# Patient Record
Sex: FEMALE | Race: WHITE
[De-identification: names, ages, dates, MRNs, and addresses within clinical notes are randomized per-mention and may not be internally consistent; named-entity substitution may affect disease eponyms.]

---

## 2017-11-21 ENCOUNTER — HOSPITAL ENCOUNTER (INPATIENT)
Dept: HOSPITAL 74 - JER | Age: 72
LOS: 7 days | Discharge: NURSING, REHAB OR LONG-TERM CARE | DRG: 682 | End: 2017-11-28
Attending: INTERNAL MEDICINE | Admitting: INTERNAL MEDICINE
Payer: COMMERCIAL

## 2017-11-21 VITALS — BODY MASS INDEX: 17.8 KG/M2

## 2017-11-21 DIAGNOSIS — K21.9: ICD-10-CM

## 2017-11-21 DIAGNOSIS — N13.39: ICD-10-CM

## 2017-11-21 DIAGNOSIS — F41.8: ICD-10-CM

## 2017-11-21 DIAGNOSIS — K63.9: ICD-10-CM

## 2017-11-21 DIAGNOSIS — N39.0: ICD-10-CM
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
## 2017-11-21 DIAGNOSIS — E87.1: ICD-10-CM

## 2017-11-21 DIAGNOSIS — S72.092A: ICD-10-CM

## 2017-11-21 DIAGNOSIS — N17.9: Primary | ICD-10-CM
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
## 2017-11-21 DIAGNOSIS — E87.6: ICD-10-CM

## 2017-11-21 DIAGNOSIS — E03.9: ICD-10-CM

## 2017-11-21 DIAGNOSIS — F03.90: ICD-10-CM

## 2017-11-21 DIAGNOSIS — D64.9: ICD-10-CM
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
## 2017-11-21 DIAGNOSIS — N18.9: ICD-10-CM

## 2017-11-21 PROCEDURE — P9038 RBC IRRADIATED: HCPCS

## 2017-11-21 PROCEDURE — P9058 RBC, L/R, CMV-NEG, IRRAD: HCPCS

## 2017-11-22 LAB
ALBUMIN SERPL-MCNC: 2.3 G/DL (ref 3.4–5)
ALP SERPL-CCNC: 104 U/L (ref 45–117)
ALT SERPL-CCNC: 34 U/L (ref 12–78)
ANION GAP SERPL CALC-SCNC: 12 MMOL/L (ref 8–16)
ANION GAP SERPL CALC-SCNC: 14 MMOL/L (ref 8–16)
APPEARANCE UR: (no result)
APTT BLD: 24.2 SECONDS (ref 26.9–34.4)
AST SERPL-CCNC: 29 U/L (ref 15–37)
BACTERIA #/AREA URNS HPF: (no result) /HPF
BASOPHILS # BLD: 0.1 % (ref 0–2)
BASOPHILS # BLD: 0.4 % (ref 0–2)
BILIRUB SERPL-MCNC: 0.3 MG/DL (ref 0.2–1)
BILIRUB UR STRIP.AUTO-MCNC: NEGATIVE MG/DL
CALCIUM SERPL-MCNC: 7.8 MG/DL (ref 8.5–10.1)
CALCIUM SERPL-MCNC: 8.3 MG/DL (ref 8.5–10.1)
CK SERPL-CCNC: 43 IU/L (ref 26–192)
CO2 SERPL-SCNC: 25 MMOL/L (ref 21–32)
CO2 SERPL-SCNC: 28 MMOL/L (ref 21–32)
COLOR UR: YELLOW
CREAT SERPL-MCNC: 1.8 MG/DL (ref 0.55–1.02)
CREAT SERPL-MCNC: 1.9 MG/DL (ref 0.55–1.02)
DEPRECATED RDW RBC AUTO: 15.6 % (ref 11.6–15.6)
DEPRECATED RDW RBC AUTO: 15.9 % (ref 11.6–15.6)
EOSINOPHIL # BLD: 0.1 % (ref 0–4.5)
EOSINOPHIL # BLD: 0.1 % (ref 0–4.5)
GLUCOSE SERPL-MCNC: 106 MG/DL (ref 74–106)
GLUCOSE SERPL-MCNC: 155 MG/DL (ref 74–106)
INR BLD: 1.19 (ref 0.82–1.09)
KETONES UR QL STRIP: NEGATIVE
LEUKOCYTE ESTERASE UR QL STRIP.AUTO: (no result)
MCH RBC QN AUTO: 29.4 PG (ref 25.7–33.7)
MCH RBC QN AUTO: 29.4 PG (ref 25.7–33.7)
MCHC RBC AUTO-ENTMCNC: 34.2 G/DL (ref 32–36)
MCHC RBC AUTO-ENTMCNC: 34.6 G/DL (ref 32–36)
MCV RBC: 84.8 FL (ref 80–96)
MCV RBC: 86 FL (ref 80–96)
NEUTROPHILS # BLD: 71 % (ref 42.8–82.8)
NEUTROPHILS # BLD: 74.4 % (ref 42.8–82.8)
NITRITE UR QL STRIP: NEGATIVE
PH UR: 5 [PH] (ref 5–8)
PLATELET # BLD AUTO: 342 K/MM3 (ref 134–434)
PLATELET # BLD AUTO: 343 K/MM3 (ref 134–434)
PMV BLD: 8.2 FL (ref 7.5–11.1)
PMV BLD: 8.3 FL (ref 7.5–11.1)
PROT SERPL-MCNC: 6.2 G/DL (ref 6.4–8.2)
PROT UR QL STRIP: (no result)
PROT UR QL STRIP: NEGATIVE
PT PNL PPP: 13.5 SEC (ref 9.98–11.88)
RBC # UR STRIP: (no result) /UL
RBC #/AREA URNS HPF: 6 /HPF (ref 0–3)
SP GR UR: 1.02 (ref 1–1.03)
TROPONIN I SERPL-MCNC: < 0.02 NG/ML (ref 0–0.05)
UROBILINOGEN UR STRIP-MCNC: NEGATIVE MG/DL (ref 0.2–1)
WBC # BLD AUTO: 7.2 K/MM3 (ref 4–10)
WBC # BLD AUTO: 7.2 K/MM3 (ref 4–10)
WBC #/AREA URNS HPF: 3414 /HPF (ref 3–5)

## 2017-11-22 PROCEDURE — 30233N1 TRANSFUSION OF NONAUTOLOGOUS RED BLOOD CELLS INTO PERIPHERAL VEIN, PERCUTANEOUS APPROACH: ICD-10-PCS

## 2017-11-22 RX ADMIN — SODIUM CHLORIDE SCH MLS/HR: 9 INJECTION, SOLUTION INTRAVENOUS at 04:14

## 2017-11-22 RX ADMIN — Medication SCH: at 08:40

## 2017-11-22 RX ADMIN — Medication SCH ML: at 11:21

## 2017-11-22 RX ADMIN — SODIUM CHLORIDE SCH: 9 INJECTION, SOLUTION INTRAVENOUS at 19:30

## 2017-11-22 RX ADMIN — PANTOPRAZOLE SODIUM SCH MG: 20 TABLET, DELAYED RELEASE ORAL at 11:21

## 2017-11-22 RX ADMIN — OXYCODONE HYDROCHLORIDE AND ACETAMINOPHEN SCH MG: 500 TABLET ORAL at 11:21

## 2017-11-22 RX ADMIN — ENOXAPARIN SODIUM SCH MG: 40 INJECTION SUBCUTANEOUS at 15:13

## 2017-11-22 RX ADMIN — DOCUSATE SODIUM SCH MG: 100 CAPSULE, LIQUID FILLED ORAL at 21:42

## 2017-11-22 RX ADMIN — PANTOPRAZOLE SODIUM SCH MG: 20 TABLET, DELAYED RELEASE ORAL at 04:14

## 2017-11-22 RX ADMIN — QUETIAPINE FUMARATE SCH MG: 25 TABLET ORAL at 11:20

## 2017-11-22 RX ADMIN — TRAZODONE HYDROCHLORIDE SCH MG: 50 TABLET ORAL at 21:43

## 2017-11-22 RX ADMIN — Medication SCH TAB: at 21:43

## 2017-11-22 RX ADMIN — QUETIAPINE FUMARATE SCH: 25 TABLET ORAL at 11:11

## 2017-11-22 RX ADMIN — OXYCODONE HYDROCHLORIDE AND ACETAMINOPHEN SCH: 500 TABLET ORAL at 11:12

## 2017-11-22 RX ADMIN — FERROUS SULFATE TAB EC 324 MG (65 MG FE EQUIVALENT) SCH MG: 324 (65 FE) TABLET DELAYED RESPONSE at 11:21

## 2017-11-22 RX ADMIN — QUETIAPINE FUMARATE SCH MG: 25 TABLET ORAL at 21:42

## 2017-11-22 RX ADMIN — SODIUM CHLORIDE SCH MLS/HR: 9 INJECTION, SOLUTION INTRAVENOUS at 09:56

## 2017-11-22 RX ADMIN — Medication SCH: at 11:11

## 2017-11-22 RX ADMIN — LEVOTHYROXINE SODIUM SCH MCG: 75 TABLET ORAL at 06:57

## 2017-11-22 RX ADMIN — Medication SCH TAB: at 11:21

## 2017-11-22 RX ADMIN — PANTOPRAZOLE SODIUM SCH: 20 TABLET, DELAYED RELEASE ORAL at 11:11

## 2017-11-22 RX ADMIN — FERROUS SULFATE TAB EC 324 MG (65 MG FE EQUIVALENT) SCH: 324 (65 FE) TABLET DELAYED RESPONSE at 11:11

## 2017-11-22 NOTE — CON.NEP
Consult


Consult Specialty:: Nephrology


Referred by:: Dr. Huerta


Reason for Consultation:: Acute on Chronc Renal Insuffiency





- History of Present Illness


Chief Complaint: Fall


History of Present Illness: 





This is a 72 year old woman with PMhx of Dementia, Frequent falls, Right 

Femoral Head Fx 11/2/2017 (refused Sx) presented with fall with left femoral 

fracture with BUN/Cr of 64/1.9. Pt is alert and oriented but unable to provide 

history. Her answers are inapporiate and she has wandering of ideas. Denies any 

pain, sob, N/V. 








- History Source


History Provided By: Patient


Limitations to Obtaining History: Clinical Condition





- Past Medical History


...Pregnant: No





- Alcohol/Substance Use


Hx Alcohol Use: No





- Smoking History


Smoking history: Never smoked


Have you smoked in the past 12 months: No





Home Medications





- Allergies


Allergies/Adverse Reactions: 


 Allergies











Allergy/AdvReac Type Severity Reaction Status Date / Time


 


No Known Allergies Allergy   Verified 11/21/17 23:49














- Home Medications


Home Medications: 


Ambulatory Orders





Aspirin [Tasha Chewable Aspirin] 81 mg PO DAILY 11/01/17 


Lactobacillus Acidophilus [Acidophilus] 1 each PO BID 11/01/17 


Trazodone HCl 25 mg PO HS 11/01/17 


Aa/Hydrolyzed Collagen, Whey [Lps Neutral Flavor Liquid] 30 ml PO DAILY 11/22/ 17 


Ascorbic Acid [Vitamin C -] 500 mg PO TID 11/22/17 


Ferrous Sulfate 325 mg PO TID 11/22/17 


Heparin - 5,000 unit SQ BID 11/22/17 


Levothyroxine [Synthroid -] 150 mcg PO ACBK 11/22/17 


Omeprazole Magnesium [Prilosec] 20 mg PO ACBK 11/22/17 


Potassium Chloride [K-Dur -] 20 meq PO DAILY 11/22/17 


Quetiapine Fumarate [Seroquel -] 37.5 mg PO BID 11/22/17 











Family Disease History





- Family Disease History


Family History: Unable to Obtain





Review of Systems





- Review of Systems


Constitutional: reports: No Symptoms


Eyes: reports: No Symptoms


Neck: reports: No Symptoms


Cardiovascular: reports: No Symptoms


Respiratory: reports: No Symptoms


Gastrointestinal: reports: No Symptoms


Genitourinary: reports: No Symptoms


Musculoskeletal: reports: Joint Pain


Integumentary: reports: No Symptoms


Neurological: reports: No Symptoms


Psychiatric: reports: Other





Nephrology Consult





- Height


Height: 5 ft





- Weight


Weight: 41.413 kg





- BMI


Body Mass Index (BMI): 17.8





- Lab Results


CBC,BMP: 


 CBC, BMP





 11/22/17 09:45 





 11/22/17 09:45 








Anion Gap: 


 Anion Gap











Anion Gap  12  (8-16)   11/22/17  09:45    














- Imaging


Chest X-ray: Report Reviewed





- Physical Examination


Vital Signs: 


 Vital Signs











Temperature  99.0 F   11/22/17 14:58


 


Pulse Rate  113 H  11/22/17 14:58


 


Respiratory Rate  18   11/22/17 14:58


 


Blood Pressure  109/59   11/22/17 14:58


 


O2 Sat by Pulse Oximetry (%)  93 L  11/22/17 09:00











Constitutional: Yes: Well Nourished, No Distress, Calm


Eyes: Yes: Conjunctiva Clear


HENT: Yes: Atraumatic, Normocephalic


Neck: Yes: Supple


Cardiovascular: Yes: Regular Rate and Rhythm


Respiratory: Yes: Regular, CTA Bilaterally


Gastrointestinal: Yes: Normal Bowel Sounds, Soft.  No: Tenderness


Renal/: Yes: Kim Present.  No: Bladder Distention, CVA Tenderness - Left, 

CVA Tenderness - Right


Extremities: No: Cold, Cool, Cyanosis


Edema: No





Assessment/Plan





72 year old woman with PMhx of Dementia, Frequent falls, Right Femoral Head Fx 

11/2/2017 (refused Sx) presented with fall with left femoral fracture with BUN/

Cr of 64/1.9.





#Acute on Chronic Renal Insufficiency


Baseline Cr unknown, was ~1.5 last admission


Check Urine studies and Renal US to access kidney size and structure


continue kim for now as pt is immobile and on pain meds and at high risk for 

retention 


Check Urine Culture as urine appears cloudy 


Continue isotonic saline with fall precautions. 





#Hip Fracture


pain control 


surgical intervention when optimized





#Anemia (acute on chronic)


Check iron studies


transfuse PRN





#dementia


supportive care





Thank you 


Will follow





 Current Medications





Amino Acids (Prosource No Carb Liquid Pkt)  30 ml PO DAILY@0800 [facility]


   Last Admin: 11/22/17 11:21 Dose:  30 ml


Ascorbic Acid (Vitamin C -)  500 mg PO DAILY [facility]


   Last Admin: 11/22/17 11:21 Dose:  500 mg


Docusate Sodium (Colace -)  300 mg PO [facility]


Enoxaparin Sodium (Lovenox -)  40 mg SQ DAILY [facility]


   Stop: 11/23/17 10:01


   Last Admin: 11/22/17 15:13 Dose:  40 mg


Ferrous Sulfate (Feosol -)  325 mg PO DAILY [facility]


   Last Admin: 11/22/17 11:21 Dose:  325 mg


Sodium Chloride (Normal Saline -)  1,000 mls @ 75 mls/hr IV ASDIR [facility]


Lactobacillus Acidophilus (Bacid -)  1 tab PO BID [facility]


   Last Admin: 11/22/17 11:21 Dose:  1 tab


Levothyroxine Sodium (Synthroid -)  150 mcg PO DAILY@0700 [facility]


   Last Admin: 11/22/17 06:57 Dose:  150 mcg


Morphine Sulfate (Morphine Sulfate)  2 mg IVPUSH Q4H PRN


   PRN Reason: PAIN


   Last Admin: 11/22/17 09:57 Dose:  2 mg


Pantoprazole Sodium (Protonix -)  20 mg PO DAILY [facility]


   Last Admin: 11/22/17 11:21 Dose:  20 mg


Quetiapine Fumarate (Seroquel -)  37.5 mg PO BID [facility]


   Last Admin: 11/22/17 11:20 Dose:  37.5 mg


Trazodone HCl (Desyrel -)  25 mg PO HS [facility]

## 2017-11-22 NOTE — HP
CHIEF COMPLAINT: left hip pain





PCP: Deanna





HISTORY OF PRESENT ILLNESS:


This is a 72 year old female with a significant past medical history of 

dementia and frequent falls s/p R humeral head fx 17 who presented to the 

ED with left hip pain s/p fall on . Xray done at nursing home ([facility]) and was reported as no fracture but pain persisted so they sent 

her here. Pt reports pain to left hip but denies to right shoulder. No other 

complaints on exam.





ER course was notable for:


(1) L femoral neck fracture


(2) Hgb 7.5


(3) BUN 64/1.9





Recent Travel:


pt denies


PAST MEDICAL HISTORY:


hypothyroidism


c diff colitis


anxiety


gerd


anemia


hypokalemia


R proximal humerus fracture 17, pt refused surgery


PAST SURGICAL HISTORY:


unknown





Social History:


Smoking: smoked briefly in her teens, none since


Alcohol: none presently


Drugs: none





Family History:


father  age 74, stomach CA


mother  84, heart failure


sister alive and well


no children





Allergies


No Known Allergies Allergy (Verified 17 23:49)





HOME MEDICATIONS:


 3





 Medication  Instructions  Recorded


 


Aspirin [Tasha Chewable Aspirin] 81 mg PO DAILY 17


 


Lactobacillus Acidophilus 1 each PO BID 17





[Acidophilus]  


 


Quetiapine Fumarate [Seroquel -] 37.5 mg PO BID 17


 


Trazodone HCl 25 mg PO HS 17


 


Ascorbic Acid [Vitamin C -] 500 mg PO DAILY #30 tablet 17


 


Ferrous Sulfate [Feosol] 325 mg PO DAILY #30 tab 17


 


Heparin - 5,000 unit SQ BID #1 vial 17


 


Levothyroxine [Synthroid -] 150 mcg PO DAILY@0700 #30 tablet 17


 


LPS 30cc daily 


 


prilosec 20mg daily 


 


vitamin c 500mg TID 


 


ferrous sulfate 325mg TID 








REVIEW OF SYSTEMS


CONSTITUTIONAL: 


Absent:  fever, chills, diaphoresis, generalized weakness, malaise, loss of 

appetite, weight change


HEENT: 


Absent:  rhinorrhea, nasal congestion, throat pain, throat swelling, difficulty 

swallowing, mouth swelling, ear pain, eye pain, visual changes


CARDIOVASCULAR: 


Absent: chest pain, syncope, palpitations, irregular heart rate, lightheadedness

, peripheral edema


RESPIRATORY: 


Absent: cough, shortness of breath, dyspnea with exertion, orthopnea, wheezing, 

stridor, hemoptysis


GASTROINTESTINAL:


Absent: abdominal pain, abdominal distension, nausea, vomiting, diarrhea, 

constipation, melena, hematochezia


GENITOURINARY: 


Absent: dysuria, frequency, urgency, hesitancy, hematuria, flank pain, genital 

pain


MUSCULOSKELETAL: Present: Left hip pain


Absent: myalgia, arthralgia, joint swelling, back pain, neck pain


SKIN: 


Absent: rash, itching, pallor


HEMATOLOGIC/IMMUNOLOGIC: 


Absent: easy bleeding, easy bruising, lymphadenopathy, frequent infections


ENDOCRINE:


Absent: unexplained weight gain, unexplained weight loss, heat intolerance, 

cold intolerance


NEUROLOGIC: 


Absent: headache, focal weakness or paresthesias, dizziness, unsteady gait, 

seizure, mental status changes, bladder or bowel incontinence


PSYCHIATRIC: 


Absent: anxiety, depression, suicidal or homicidal ideation, hallucinations.








PHYSICAL EXAMINATION


Vital Signs - 24 hr





 3





  17





  23:42 01:36


 


Temperature 99.5 F 


 


Pulse Rate 109 H 


 


Pulse Rate [  108 H





Apical]  


 


Respiratory 19 20





Rate  


 


Blood Pressure 101/83 


 


Blood Pressure  99/55





[Left Arm]  


 


O2 Sat by Pulse 97 93 L





Oximetry (%)  








GENERAL: Awake, alert, and fully oriented, in no acute distress.


HEAD: Normal with no signs of trauma.


EYES: Pupils equal, round and reactive to light, extraocular movements intact, 

sclera anicteric, conjunctiva clear. No lid lag.


EARS, NOSE, THROAT: Ears normal, nares patent, oropharynx clear without 

exudates. Moist mucous membranes.


NECK: Normal range of motion, supple without lymphadenopathy, JVD, or masses.


LUNGS: Breath sounds equal, clear to auscultation bilaterally. No wheezes, and 

no crackles. No accessory muscle use.


HEART: Regular rate and rhythm, normal S1 and S2 without murmur, rub or gallop.


ABDOMEN: Soft, nontender, not distended, normoactive bowel sounds, no guarding, 

no rebound, no masses.  No hepatomegaly or  splenomegaly. 


MUSCULOSKELETAL: Normal range of motion at all joints except left hip/right 

shoulder. No bony deformities or tenderness. No CVA tenderness. decreased ROM 

right shoulder but no pain. Pain on attempt at ROM left hip, not performed, 

Left leg shortened and externally rotated


UPPER EXTREMITIES: 2+ pulses, warm, well-perfused. No cyanosis. No clubbing. No 

peripheral edema.


LOWER EXTREMITIES: 2+ pulses, warm, well-perfused. No calf tenderness. No 

peripheral edema. 


NEUROLOGICAL:  Cranial nerves II-XII intact. Normal speech. Normal gait.


PSYCHIATRIC: Cooperative. Good eye contact. Appropriate mood and affect.


SKIN: Warm, dry, normal turgor, no rashes or lesions noted, normal capillary 

refill. 


 


Laboratory Results - last 24 hr





 3





  17





  02:23 02:23 02:23


 


WBC   


 


RBC   


 


Hgb   


 


Hct   


 


MCV   


 


MCH   


 


MCHC   


 


RDW   


 


Plt Count   


 


MPV   


 


Neutrophils %   


 


Lymphocytes %   


 


Monocytes %   


 


Eosinophils %   


 


Basophils %   


 


PT with INR  13.50 H  


 


INR  1.19 H  


 


PTT (Actin FS)  24.2 L  


 


Sodium   127 L 


 


Potassium   3.3 L 


 


Chloride   88 L D 


 


Carbon Dioxide   25 


 


Anion Gap   14 


 


BUN   64 H D 


 


Creatinine   1.9 H D 


 


Creat Clearance w eGFR   25.84 


 


Random Glucose   155 H D 


 


Lactic Acid   


 


Calcium   7.8 L 


 


Total Bilirubin   0.3  D 


 


AST   29  D 


 


ALT   34  D 


 


Alkaline Phosphatase   104  D 


 


Creatine Kinase   43 


 


Troponin I   < 0.02 


 


B-Natriuretic Peptide   810.74 H 


 


Total Protein   6.2 L 


 


Albumin   2.3 L D 


 


Urine Color   


 


Urine Appearance   


 


Urine pH   


 


Ur Specific Gravity   


 


Urine Protein   


 


Urine Glucose (UA)   


 


Urine Ketones   


 


Urine Blood   


 


Urine Nitrite   


 


Urine Bilirubin   


 


Urine Urobilinogen   


 


Urine Bacteria   


 


Crossmatch    See Detail








 3





  17





  02:23 02:48 02:55


 


WBC  7.2  D  


 


RBC  2.55 L  


 


Hgb  7.5 L  


 


Hct  21.9 L  


 


MCV  86.0  


 


MCH  29.4  


 


MCHC  34.2  


 


RDW  15.9 H  


 


Plt Count  343  


 


MPV  8.3  


 


Neutrophils %  74.4  


 


Lymphocytes %  8.8  D  


 


Monocytes %  16.6 H D  


 


Eosinophils %  0.1  D  


 


Basophils %  0.1  


 


PT with INR   


 


INR   


 


PTT (Actin FS)   


 


Sodium   


 


Potassium   


 


Chloride   


 


Carbon Dioxide   


 


Anion Gap   


 


BUN   


 


Creatinine   


 


Creat Clearance w eGFR   


 


Random Glucose   


 


Lactic Acid    1.7


 


Calcium   


 


Total Bilirubin   


 


AST   


 


ALT   


 


Alkaline Phosphatase   


 


Creatine Kinase   


 


Troponin I   


 


B-Natriuretic Peptide   


 


Total Protein   


 


Albumin   


 


Urine Color   Yellow 


 


Urine Appearance   Turbid 


 


Urine pH   5.0 


 


Ur Specific Gravity   1.017 


 


Urine Protein   2+ H 


 


Urine Glucose (UA)   Negative 


 


Urine Ketones   Negative 


 


Urine Blood   3+ H 


 


Urine Nitrite   Negative 


 


Urine Bilirubin   Negative 


 


Urine Urobilinogen   Negative 


 


Urine Bacteria   Many 


 


Crossmatch   








ECG


Sinus rhythm


vent rate 102, 


No acute ST/T changes





Radiology reports:


Left hip


femoral neck fracture noted, official read pending





CXR


no obvious infiltrates or effusions, official read pending








ASSESSMENT/PLAN:


72yF with PMH dementia, hypothyroidism, c diff colitis, anxiety, GERD, anemia, 

hypokalemia, R proximal humerus fracture presented to the ED s/p fall  

with hip pain.





Left femoral neck fracture


   - morphine 2mg IVP q4h PRN for pain


   - ortho consult


   - NPO for possible OR


   - kim cath for now, remove within 24 hours after fracture repair.





Anemia


   - will transfuse 1uPRBC, repeat h/h after


   - cont iron therapy





UTI


   - urine noted with gross pyuria


   - cont ceftriaxone, f/u cultures to ensure coverage





hypokalemia


   - repleted, repeat in am





SKIP


   - cr up from 1.45 on 


   - likely due to blood loss and volume depletion


   - start NS 100cc/hr, monitor volume status closely as she is also getting 

blood





Hyponatremia


   - likely due to hypovolemia, give IVF and repeat BMP 





hypothyroidism


   - synthroid dose increased a few weeks ago due to elevated TSH, will not 

repeat as adequate time interval (4-6weeks) has not elapsed, cont increased dose





DVT PPX


   - will defer due to possibility of OR tomorrow, start if pt does not go to OR





FEN


   - NS @ 100cc/hr


   - BMP in am


   - NPO for possible OR





Dispo: Pt currently requires inpatient management of her emergent symptoms and 

anticipated LOS is >48h











Visit type





- Emergency Visit


Emergency Visit: Yes


ED Registration Date: 17


Care time: The patient presented to the Emergency Department on the above date 

and was hospitalized for further evaluation of their emergent condition.





- New Patient


This patient is new to me today: Yes


Date on this admission: 17





- Critical Care


Critical Care patient: No

## 2017-11-22 NOTE — PDOC
History of Present Illness





- General


Chief Complaint: Injury


Stated Complaint: FALL


Time Seen by Provider: 11/22/17 00:01


History Source: Nursing Home Records


Exam Limitations: Dementia





- History of Present Illness


Initial Comments: 





11/22/17 02:25





73yo Female patient w/ PmHx: Anemia, CAD, GERD, Hypokalemia, Early Dementia, C-

Diff presented to ED via EMS from [facility] for fall. Staff 

state patient fell on Nov 20th, X-rays done on 21st results: No fracture or 

dislocation. Staff report patient continues to c/o pain and was sent to this ED 

for evaluation. Anticoagulation: ASA 81 and Heparin 5000 units.











Occurred: reports: other (2 days).  denies: just prior to arrival, this morning

, this afternoon, this evening, yesterday, last week


Severity: reports: moderate.  denies: mild, severe


Pain Location: reports: lower extremity.  denies: none, abdomen, back, chest, 

face, head, mouth, neck, other, pelvis, upper extremity


Method of Injury: Yes: fall.  No: unknown, assault, direct blow, motor vehicle 

crash, other


Modifying Factors: improves with: immobilization.  worse with: None, cold 

therapy, pain medication, rest, other





Past History





- Travel


Traveled outside of the country in the last 30 days: No


Close contact w/someone who was outside of country & ill: No





- Past Medical History


Allergies/Adverse Reactions: 


 Allergies











Allergy/AdvReac Type Severity Reaction Status Date / Time


 


No Known Allergies Allergy   Verified 11/21/17 23:49











Home Medications: 


Ambulatory Orders





Aspirin [Tasha Chewable Aspirin] 81 mg PO DAILY 11/01/17 


Lactobacillus Acidophilus [Acidophilus] 1 each PO BID 11/01/17 


Levothyroxine Sodium [Levo-T] 100 mcg PO DAILY 11/01/17 


Quetiapine Fumarate [Seroquel -] 37.5 mg PO BID 11/01/17 


Trazodone HCl 25 mg PO HS 11/01/17 


Vancomycin 125 mg PO QID 11/01/17 


Ascorbic Acid [Vitamin C -] 500 mg PO DAILY #30 tablet 11/04/17 


Ferrous Sulfate [Feosol] 325 mg PO DAILY #30 tab 11/04/17 


Heparin - 5,000 unit SQ BID #1 vial 11/04/17 


Levothyroxine [Synthroid -] 125 mcg PO DAILY@0700 #30 tablet 11/04/17 


Vancomycin Oral Solution 125 mg PO QID #1 ml 11/04/17 








Anemia: No


Asthma: No


Cancer: No


Cardiac Disorders: No


CVA: No


COPD: No


CHF: No


Dementia: No


Diabetes: No


GI Disorders: Yes (enterocolitis d/t c diff)


 Disorders: No


HTN: No


Hypercholesterolemia: No


Liver Disease: No


Psychiatric Problems: Yes (anxiety)


Seizures: No


Thyroid Disease: Yes (hypo)





- Surgical History


Abdominal Surgery: No


Appendectomy: No


Cardiac Surgery: No


Cholecystectomy: No


Lung Surgery: No


Neurologic Surgery: No


Orthopedic Surgery: No





- Suicide/Smoking/Psychosocial Hx


Smoking History: Never smoked


Have you smoked in the past 12 months: No


Information on smoking cessation initiated: No


Hx Alcohol Use: No


Drug/Substance Use Hx: No


Substance Use Type: None


Hx Substance Use Treatment: No





Trauma Specific PMHX





- Complaint Specific PMHX


Arthritis: No


Back Injury: No


Neck Injury: No


Hx Sacro Iliac Joint Dysfunction: No





**Review of Systems





- Review of Systems


Able to Perform ROS?: Yes


Is the patient limited English proficient: No


Musculoskeletal: Yes: Joint Pain, Other (Hip Pain)


All Other Systems: Reviewed and Negative





*Physical Exam





- Vital Signs


 Last Vital Signs











Temp Pulse Resp BP Pulse Ox


 


 99.5 F   108 H  20   99/55   93 L


 


 11/21/17 23:42  11/22/17 01:36  11/22/17 01:36  11/22/17 01:36  11/22/17 01:36














- Physical Exam


General Appearance: Yes: Appropriately Dressed, Moderate Distress, Thin


Neck: positive: Trachea midline, Supple.  negative: Lymphadenopathy (R), 

Lymphadenopathy (L), Tender lateral, Tender midline


Respiratory/Chest: positive: Lungs Clear, Normal Breath Sounds.  negative: 

Chest Tender, Respiratory Distress, Accessory Muscle Use, Labored Respiration, 

Rapid RR, Rhonchi, Stridor, Wheezing


Cardiovascular: positive: Tachycardia


Gastrointestinal/Abdominal: positive: Normal Bowel Sounds.  negative: Distended

, Guarding, Rebound, Tenderness


Musculoskeletal: positive: Normal Inspection.  negative: CVA Tenderness, 

Vertebral Tenderness


Extremity: positive: Normal Capillary Refill, Pelvis Stable.  negative: Normal 

Inspection (Left leg shortening), Normal Range of Motion (Limited ROM to left 

leg), Pedal Edema, Swelling, Calf Tenderness, Erythema, Inflammation


Integumentary: positive: Normal Color, Dry, Warm


Neurologic: positive: Alert, Normal Mood/Affect, Motor Strength 5/5





**Heart Score/ECG Review





- ECG Impressions


Normal ECG: Yes


Non-specific ST Elevation: No


Ischemic Changes: No


Bradycardia: No


Tachycardia: Sinus


Torsades leila Pointes: No


WPW: No





ED Treatment Course





- LABORATORY


CBC & Chemistry Diagram: 


 11/22/17 02:23





 11/22/17 02:23





- RADIOLOGY


Radiology Studies Ordered: 














 Category Date Time Status


 


 ABDOMEN & PELVIS CT W/O CONTR [CT] Stat CT Scan  11/22/17 02:08 Ordered


 


 CHEST X-RAY PORTABLE* [RAD] [facility]  11/22/17 02:07 Ordered


 


 HIP & PELVIS-LEFT [RAD] [facility]  11/22/17 00:39 Taken














*DC/Admit/Observation/Transfer


Diagnosis at time of Disposition: 


Anemia


Qualifiers:


 Anemia type: unspecified type Qualified Code(s): D64.9 - Anemia, unspecified





Fracture of left hip


Qualifiers:


 Encounter type: initial encounter Fracture type: closed Qualified Code(s): 

S72.002A - Fracture of unspecified part of neck of left femur, initial 

encounter for closed fracture





UTI (urinary tract infection)


Qualifiers:


 Urinary tract infection type: acute cystitis Hematuria presence: without 

hematuria Qualified Code(s): N30.00 - Acute cystitis without hematuria








- Discharge Dispostion


Condition at time of disposition: Fair


Admit: Yes





- Referrals


Referrals: 


Tyrone Huerta MD [Primary Care Provider] - 





- Patient Instructions





- Post Discharge Activity

## 2017-11-22 NOTE — PN
Progress Note (short form)





- Note


Progress Note: 





Patient seen and examined.


C/O Left hip pain


S/P Fall with left hip fracture.


Denies chest pain, shortness of breath, palpitation or dizziness.


Denies nausea, vomiting, abdominal pain.


Afebrile.





ER course was notable for:


(1) L femoral neck fracture


(2) Hgb 7.5


(3) BUN 64/1.9





Recent Travel:


pt denies


PAST MEDICAL HISTORY:


hypothyroidism


c diff colitis


anxiety


gerd


anemia


hypokalemia


R proximal humerus fracture 17, pt refused surgery


PAST SURGICAL HISTORY:


unknown





Social History:


Smoking: smoked briefly in her teens, none since


Alcohol: none presently


Drugs: none





Family History:


father  age 74, stomach CA


mother  84, heart failure


sister alive and well


no children





Allergies


No Known Allergies Allergy (Verified 17 23:49)





HOME MEDICATIONS:


 3





 Medication  Instructions  Recorded


 


Aspirin [Tasha Chewable Aspirin] 81 mg PO DAILY 17


 


Lactobacillus Acidophilus 1 each PO BID 17





[Acidophilus]  


 


Quetiapine Fumarate [Seroquel -] 37.5 mg PO BID 17


 


Trazodone HCl 25 mg PO HS 17


 


Ascorbic Acid [Vitamin C -] 500 mg PO DAILY #30 tablet 17


 


Ferrous Sulfate [Feosol] 325 mg PO DAILY #30 tab 17


 


Heparin - 5,000 unit SQ BID #1 vial 17


 


Levothyroxine [Synthroid -] 150 mcg PO DAILY@0700 #30 tablet 17


 


LPS 30cc daily 


 


prilosec 20mg daily 


 


vitamin c 500mg TID 


 


ferrous sulfate 325mg TID 








REVIEW OF SYSTEMS


CONSTITUTIONAL: 


Absent:  fever, chills, diaphoresis, generalized weakness, malaise, loss of 

appetite, weight change


HEENT: 


Absent:  rhinorrhea, nasal congestion, throat pain, throat swelling, difficulty 

swallowing, mouth swelling, ear pain, eye pain, visual changes


CARDIOVASCULAR: 


Absent: chest pain, syncope, palpitations, irregular heart rate, lightheadedness

, peripheral edema


RESPIRATORY: 


Absent: cough, shortness of breath, dyspnea with exertion, orthopnea, wheezing, 

stridor, hemoptysis


GASTROINTESTINAL:


Absent: abdominal pain, abdominal distension, nausea, vomiting, diarrhea, 

constipation, melena, hematochezia


GENITOURINARY: 


Absent: dysuria, frequency, urgency, hesitancy, hematuria, flank pain, genital 

pain


MUSCULOSKELETAL: Present: Left hip pain


Absent: myalgia, arthralgia, joint swelling, back pain, neck pain


SKIN: 


Absent: rash, itching, pallor


HEMATOLOGIC/IMMUNOLOGIC: 


Absent: easy bleeding, easy bruising, lymphadenopathy, frequent infections


ENDOCRINE:


Absent: unexplained weight gain, unexplained weight loss, heat intolerance, 

cold intolerance


NEUROLOGIC: 


Absent: headache, focal weakness or paresthesias, dizziness, unsteady gait, 

seizure, mental status changes, bladder or bowel incontinence


PSYCHIATRIC: 


Absent: anxiety, depression, suicidal or homicidal ideation, hallucinations.








PHYSICAL EXAMINATION


 Vital Signs











 Period  Temp  Pulse  Resp  BP Sys/Barriga  Pulse Ox


 


 Last 24 Hr  98.4 F-99.5 F    19-20  /55-83  93-97











GENERAL: Awake, alert, and fully oriented, in no acute distress.


HEAD: Normal with no signs of trauma.


EYES: Pupils equal, round and reactive to light, extraocular movements intact, 

sclera anicteric, conjunctiva clear. No lid lag.


EARS, NOSE, THROAT: Ears normal, nares patent, oropharynx clear without 

exudates. Moist mucous membranes.


NECK: Normal range of motion, supple without lymphadenopathy, JVD, or masses.


LUNGS: Breath sounds equal, clear to auscultation bilaterally. No wheezes, and 

no crackles. No accessory muscle use.


HEART: Regular rate and rhythm, normal S1 and S2 without murmur, rub or gallop.


ABDOMEN: Soft, nontender, not distended, normoactive bowel sounds, no guarding, 

no rebound, no masses.  No hepatomegaly or  splenomegaly. 


MUSCULOSKELETAL: Normal range of motion at all joints except left hip/right 

shoulder. No bony deformities or tenderness. No CVA tenderness. decreased ROM 

right shoulder but no pain. Pain on attempt at ROM left hip, not performed, 

Left leg shortened and externally rotated


UPPER EXTREMITIES: 2+ pulses, warm, well-perfused. No cyanosis. No clubbing. No 

peripheral edema.


LOWER EXTREMITIES: 2+ pulses, warm, well-perfused. No calf tenderness. No 

peripheral edema. 


NEUROLOGICAL:  Cranial nerves II-XII intact. Normal speech. Normal gait.


PSYCHIATRIC: Cooperative. Good eye contact. Appropriate mood and affect.


SKIN: Warm, dry, normal turgor, no rashes or lesions noted, normal capillary 

refill. 


 


 CBC, BMP





 17 09:45 





 17 09:45 














ECG


Sinus rhythm


vent rate 102, 


No acute ST/T changes





Radiology reports:


Left hip


femoral neck fracture noted, official read pending





CXR


no obvious infiltrates or effusions, official read pending








ASSESSMENT/PLAN:


72yF with PMH dementia, hypothyroidism, c diff colitis, anxiety, GERD, anemia, 

hypokalemia, R proximal humerus fracture presented to the ED s/p fall  

with hip pain.





Left femoral neck fracture


          - Ortho consult noted.


          - Surgery deferred today because of Na 129. Scheduled for Friday.


   - Continue morphine 2mg IVP q4h PRN for pain


   - kim cath for now, remove within 24 hours after fracture repair.





Anemia


   - H/H improved.


          - S/P 1 unit prbc transfusion.


   - Cont iron therapy





UTI


   - urine noted with gross pyuria


   - cont ceftriaxone, f/u cultures to ensure coverage





hypokalemia


   - repleted, repeat in am





SKIP


   - cr up from 1.45 on 


   - likely due to blood loss and volume depletion


   - Continue NS 75 cc/hr.





Hyponatremia


   - likely due to hypovolemia, continue  IVF and repeat BMP 





hypothyroidism


   - synthroid dose increased a few weeks ago due to elevated TSH, will not 

repeat as adequate time interval (4-6weeks) has not elapsed, cont increased dose





DVT PPX


   - Lovenox 40 mg sc daily. Plan for surgery on Friday.





FEN


   - NS @ 75 cc/hr


   - BMP in am


   - Regular diet.





Dispo: Pt currently requires inpatient management of her emergent symptoms and 

anticipated LOS is >48h











Patient is not medically cleared for surgery as of today because of Na 129 and 

SKIP.


Continue IV fluids. Check BMP tomorrow.


She should be ready for surgery on Friday.

## 2017-11-22 NOTE — EKG
Test Reason : 

Blood Pressure : ***/*** mmHG

Vent. Rate : 102 BPM     Atrial Rate : 102 BPM

   P-R Int : 128 ms          QRS Dur : 084 ms

    QT Int : 344 ms       P-R-T Axes : 042 026 045 degrees

   QTc Int : 448 ms

 

SINUS TACHYCARDIA

OTHERWISE NORMAL ECG

WHEN COMPARED WITH ECG OF 02-NOV-2017 03:19,

NO SIGNIFICANT CHANGE WAS FOUND

Confirmed by SOLOMON SIMPSON MD ([id number]) on 11/22/2017 10:37:58 AM

 

Referred By:             Confirmed By:SOLOMON SIMPSON MD

## 2017-11-22 NOTE — PDOC
*Physical Exam





- Vital Signs


 Last Vital Signs











Temp Pulse Resp BP Pulse Ox


 


 99.5 F   108 H  20   99/55   93 L


 


 11/21/17 23:42  11/22/17 01:36  11/22/17 01:36  11/22/17 01:36  11/22/17 01:36














ED Treatment Course





- LABORATORY


CBC & Chemistry Diagram: 


 11/22/17 09:45





 11/22/17 09:45





- ADDITIONAL ORDERS


Additional order review: 


 











  11/22/17





  02:23


 


RBC  2.55 L


 


MCV  86.0


 


MCHC  34.2


 


RDW  15.9 H


 


MPV  8.3


 


Neutrophils %  74.4


 


Lymphocytes %  8.8  D


 


Monocytes %  16.6 H D


 


Eosinophils %  0.1  D


 


Basophils %  0.1














Medical Decision Making





- Medical Decision Making





11/22/17 02:33


agree with care from ROSA Bermudez.  Pt to be admitted to hospitalist service for 

left hip fx





*DC/Admit/Observation/Transfer


Diagnosis at time of Disposition: 


 Anemia, Fracture of left hip, UTI (urinary tract infection)








- Discharge Dispostion


Condition at time of disposition: Fair





- Referrals





- Patient Instructions





- Post Discharge Activity

## 2017-11-22 NOTE — CONSULT
Consult - text type





- Consultation


Consultation Note: 





FULL CONSULT DICTATED





IMP: DISPLACED LEFT FEMORAL NECK HIP FX


PLAN: --> FOR LEFT HIP HEMIARTHROPLASTY WHEN MEDICALLY OPTIMIZED

## 2017-11-22 NOTE — PN
Progress Note (short form)





- Note


Progress Note: 





follow up Mw=594. BUN/Cr also elevated Anesthesia wants to hold off surgery 

until labs improve.  Patient scheduled for Friday morning

## 2017-11-23 LAB
ALBUMIN SERPL-MCNC: 2 G/DL (ref 3.4–5)
ALP SERPL-CCNC: 97 U/L (ref 45–117)
ALT SERPL-CCNC: 25 U/L (ref 12–78)
ANION GAP SERPL CALC-SCNC: 9 MMOL/L (ref 8–16)
AST SERPL-CCNC: 16 U/L (ref 15–37)
BASOPHILS # BLD: 0.3 % (ref 0–2)
BILIRUB SERPL-MCNC: 0.8 MG/DL (ref 0.2–1)
CALCIUM SERPL-MCNC: 7.8 MG/DL (ref 8.5–10.1)
CO2 SERPL-SCNC: 25 MMOL/L (ref 21–32)
CREAT SERPL-MCNC: 1.4 MG/DL (ref 0.55–1.02)
DEPRECATED RDW RBC AUTO: 16.3 % (ref 11.6–15.6)
EOSINOPHIL # BLD: 0.5 % (ref 0–4.5)
GLUCOSE SERPL-MCNC: 114 MG/DL (ref 74–106)
MAGNESIUM SERPL-MCNC: 2.3 MG/DL (ref 1.8–2.4)
MCH RBC QN AUTO: 29.5 PG (ref 25.7–33.7)
MCHC RBC AUTO-ENTMCNC: 34.6 G/DL (ref 32–36)
MCV RBC: 85.2 FL (ref 80–96)
NEUTROPHILS # BLD: 67.7 % (ref 42.8–82.8)
PHOSPHATE SERPL-MCNC: 3.7 MG/DL (ref 2.5–4.9)
PLATELET # BLD AUTO: 355 K/MM3 (ref 134–434)
PMV BLD: 8.3 FL (ref 7.5–11.1)
PROT SERPL-MCNC: 5.6 G/DL (ref 6.4–8.2)
WBC # BLD AUTO: 6.3 K/MM3 (ref 4–10)

## 2017-11-23 RX ADMIN — ENOXAPARIN SODIUM SCH MG: 40 INJECTION SUBCUTANEOUS at 09:25

## 2017-11-23 RX ADMIN — Medication SCH TAB: at 21:28

## 2017-11-23 RX ADMIN — QUETIAPINE FUMARATE SCH MG: 25 TABLET ORAL at 09:24

## 2017-11-23 RX ADMIN — LEVOTHYROXINE SODIUM SCH MCG: 75 TABLET ORAL at 06:37

## 2017-11-23 RX ADMIN — FERROUS SULFATE TAB EC 324 MG (65 MG FE EQUIVALENT) SCH MG: 324 (65 FE) TABLET DELAYED RESPONSE at 09:25

## 2017-11-23 RX ADMIN — DOCUSATE SODIUM SCH MG: 100 CAPSULE, LIQUID FILLED ORAL at 21:29

## 2017-11-23 RX ADMIN — OXYCODONE HYDROCHLORIDE AND ACETAMINOPHEN SCH MG: 500 TABLET ORAL at 09:24

## 2017-11-23 RX ADMIN — Medication SCH TAB: at 09:25

## 2017-11-23 RX ADMIN — PANTOPRAZOLE SODIUM SCH MG: 20 TABLET, DELAYED RELEASE ORAL at 09:25

## 2017-11-23 RX ADMIN — QUETIAPINE FUMARATE SCH: 25 TABLET ORAL at 09:29

## 2017-11-23 RX ADMIN — CEFTRIAXONE SCH MLS/HR: 1 INJECTION, SOLUTION INTRAVENOUS at 09:25

## 2017-11-23 RX ADMIN — QUETIAPINE FUMARATE SCH MG: 25 TABLET ORAL at 21:27

## 2017-11-23 RX ADMIN — SODIUM CHLORIDE SCH MLS/HR: 9 INJECTION, SOLUTION INTRAVENOUS at 02:46

## 2017-11-23 RX ADMIN — Medication SCH ML: at 09:24

## 2017-11-23 RX ADMIN — TRAZODONE HYDROCHLORIDE SCH MG: 50 TABLET ORAL at 21:30

## 2017-11-23 NOTE — PN
Progress Note (short form)





- Note


Progress Note: 





Subjective: The patient was seen and examined at the bedside, she states it is 

November 30, 1960. She is A&Ox2 (person and place)


Na improving 132 today





 Current Medications











Generic Name Dose Route Start Last Admin





  Trade Name Freq  PRN Reason Stop Dose Admin


 


Amino Acids  30 ml  11/22/17 08:00  11/23/17 09:24





  Prosource No Carb Liquid Pkt  PO   30 ml





  DAILY@0800 MARKO   Administration


 


Ascorbic Acid  500 mg  11/22/17 10:00  11/23/17 09:24





  Vitamin C -  PO   500 mg





  DAILY MARKO   Administration


 


Docusate Sodium  300 mg  11/22/17 22:00  11/22/17 21:42





  Colace -  PO   300 mg





  HS MARKO   Administration


 


Ferrous Sulfate  325 mg  11/22/17 10:00  11/23/17 09:25





  Feosol -  PO   325 mg





  DAILY MARKO   Administration


 


Sodium Chloride  1,000 mls @ 75 mls/hr  11/22/17 13:49  11/23/17 02:46





  Normal Saline -  IV   75 mls/hr





  ASDIR MARKO   Administration


 


CEFTRIAXONE 1 G/50 ML PREMIX  50 mls @ 100 mls/hr  11/23/17 10:00  11/23/17 09:

25





  Ceftriaxone 1 Gm-D5w Bag  IVPB   100 mls/hr





  DAILY MARKO   Administration


 


Lactobacillus Acidophilus  1 tab  11/22/17 10:00  11/23/17 09:25





  Bacid -  PO   1 tab





  BID MARKO   Administration


 


Levothyroxine Sodium  150 mcg  11/22/17 07:00  11/23/17 06:37





  Synthroid -  PO   150 mcg





  DAILY@0700 MARKO   Administration


 


Morphine Sulfate  2 mg  11/22/17 02:48  11/22/17 09:57





  Morphine Sulfate  IVPUSH   2 mg





  Q4H PRN   Administration





  PAIN   


 


Pantoprazole Sodium  20 mg  11/22/17 03:45  11/23/17 09:25





  Protonix -  PO   20 mg





  DAILY MARKO   Administration


 


Quetiapine Fumarate  37.5 mg  11/22/17 10:00  11/23/17 09:29





  Seroquel -  PO   Not Given





  BID MARKO   


 


Trazodone HCl  25 mg  11/22/17 22:00  11/22/17 21:43





  Desyrel -  PO   25 mg





  HS MARKO   Administration








Objective: 


 Vital Signs











 Period  Temp  Pulse  Resp  BP Sys/Barriga  Pulse Ox


 


 Last 24 Hr  98.1 F-99 F    18-20  /63-67  94-94








Physical Exam: 


General: NAD, A&Ox2


Lungs: CTA bilaterally


Heart:

## 2017-11-23 NOTE — PN
Progress Note (short form)





- Note


Progress Note: 





Renal follow up for SKIP





Pt seen and examined at the bedside


awake and alert


continues to have random and unusual responses to questions


but is oriented


making urine


on IVF 





 Vital Signs











Temperature  98.1 F   11/23/17 05:00


 


Pulse Rate  107 H  11/23/17 05:00


 


Respiratory Rate  18   11/23/17 05:00


 


Blood Pressure  94/64   11/23/17 05:00


 


O2 Sat by Pulse Oximetry (%)  94 L  11/22/17 21:00








 Intake & Output











 11/20/17 11/21/17 11/22/17 11/23/17





 23:59 23:59 23:59 23:59


 


Intake Total   2025 1550


 


Output Total   600 600


 


Balance   1425 950


 


Weight  43.091 kg 41.413 kg 








NAD, awake and alert


RRR


CTA


soft NT/ND


No LE edema





 CBC, BMP





 11/23/17 07:30 





 11/23/17 07:30 





 Current Medications





Amino Acids (Prosource No Carb Liquid Pkt)  30 ml PO DAILY@0800 [facility]


   Last Admin: 11/23/17 09:24 Dose:  30 ml


Ascorbic Acid (Vitamin C -)  500 mg PO DAILY [facility]


   Last Admin: 11/23/17 09:24 Dose:  500 mg


Docusate Sodium (Colace -)  300 mg PO HS [facility]


   Last Admin: 11/22/17 21:42 Dose:  300 mg


Ferrous Sulfate (Feosol -)  325 mg PO DAILY [facility]


   Last Admin: 11/23/17 09:25 Dose:  325 mg


Sodium Chloride (Normal Saline -)  1,000 mls @ 75 mls/hr IV ASDIR [facility]


   Last Admin: 11/23/17 02:46 Dose:  75 mls/hr


CEFTRIAXONE 1 G/50 ML PREMIX (Ceftriaxone 1 Gm-D5w Bag)  50 mls @ 100 mls/hr 

IVPB DAILY [facility]


   Last Admin: 11/23/17 09:25 Dose:  100 mls/hr


Lactobacillus Acidophilus (Bacid -)  1 tab PO BID [facility]


   Last Admin: 11/23/17 09:25 Dose:  1 tab


Levothyroxine Sodium (Synthroid -)  150 mcg PO DAILY@0700 [facility]


   Last Admin: 11/23/17 06:37 Dose:  150 mcg


Morphine Sulfate (Morphine Sulfate)  2 mg IVPUSH Q4H PRN


   PRN Reason: PAIN


   Last Admin: 11/22/17 09:57 Dose:  2 mg


Pantoprazole Sodium (Protonix -)  20 mg PO DAILY [facility]


   Last Admin: 11/23/17 09:25 Dose:  20 mg


Quetiapine Fumarate (Seroquel -)  37.5 mg PO BID [facility]


   Last Admin: 11/23/17 09:29 Dose:  Not Given


Trazodone HCl (Desyrel -)  25 mg PO HS [facility]


   Last Admin: 11/22/17 21:43 Dose:  25 mg





72 year old woman with PMhx of Dementia, Frequent falls, Right Femoral Head Fx 

11/2/2017 (refused Sx) presented with fall with left femoral fracture with BUN/

Cr of 64/1.9.





#Acute on Chronic Renal Insufficiency


Baseline Cr unknown, was ~1.5 last admission


Renal function is improving 


continue istonic saline


US showed hydronephrosis on right side, ? significnce and if there is a 

functional obstruction


will get a non-contrast CT to access further 


avoid ACE/ARB and contrast studies for now





#Hyponatremia


improving with isotonic saline


trend Na Q24hrs 





#Hip Fracture


pain control 


surgical intervention when optimized





#Anemia (acute on chronic)


Check iron studies


transfuse PRN





#dementia


supportive care





Thank you 


Will follow

## 2017-11-24 LAB
ALBUMIN SERPL-MCNC: 1.9 G/DL (ref 3.4–5)
ALP SERPL-CCNC: 95 U/L (ref 45–117)
ALT SERPL-CCNC: 39 U/L (ref 12–78)
ANION GAP SERPL CALC-SCNC: 10 MMOL/L (ref 8–16)
AST SERPL-CCNC: 32 U/L (ref 15–37)
BASOPHILS # BLD: 0.5 % (ref 0–2)
BILIRUB SERPL-MCNC: 0.6 MG/DL (ref 0.2–1)
CALCIUM SERPL-MCNC: 7.7 MG/DL (ref 8.5–10.1)
CO2 SERPL-SCNC: 24 MMOL/L (ref 21–32)
CREAT SERPL-MCNC: 1.1 MG/DL (ref 0.55–1.02)
DEPRECATED RDW RBC AUTO: 15.7 % (ref 11.6–15.6)
EOSINOPHIL # BLD: 0.9 % (ref 0–4.5)
GLUCOSE SERPL-MCNC: 114 MG/DL (ref 74–106)
MAGNESIUM SERPL-MCNC: 2.3 MG/DL (ref 1.8–2.4)
MCH RBC QN AUTO: 28.9 PG (ref 25.7–33.7)
MCHC RBC AUTO-ENTMCNC: 33.7 G/DL (ref 32–36)
MCV RBC: 85.7 FL (ref 80–96)
NEUTROPHILS # BLD: 71.3 % (ref 42.8–82.8)
PHOSPHATE SERPL-MCNC: 3.3 MG/DL (ref 2.5–4.9)
PLATELET # BLD AUTO: 383 K/MM3 (ref 134–434)
PMV BLD: 7.9 FL (ref 7.5–11.1)
PROT SERPL-MCNC: 5.6 G/DL (ref 6.4–8.2)
WBC # BLD AUTO: 6.6 K/MM3 (ref 4–10)

## 2017-11-24 RX ADMIN — LEVOTHYROXINE SODIUM SCH MCG: 75 TABLET ORAL at 06:18

## 2017-11-24 RX ADMIN — PANTOPRAZOLE SODIUM SCH: 20 TABLET, DELAYED RELEASE ORAL at 14:16

## 2017-11-24 RX ADMIN — OXYCODONE HYDROCHLORIDE AND ACETAMINOPHEN SCH: 500 TABLET ORAL at 14:16

## 2017-11-24 RX ADMIN — Medication SCH TAB: at 21:32

## 2017-11-24 RX ADMIN — CEFTRIAXONE SCH MLS/HR: 1 INJECTION, SOLUTION INTRAVENOUS at 11:06

## 2017-11-24 RX ADMIN — Medication SCH: at 14:16

## 2017-11-24 RX ADMIN — SODIUM CHLORIDE SCH MLS/HR: 9 INJECTION, SOLUTION INTRAVENOUS at 21:31

## 2017-11-24 RX ADMIN — FERROUS SULFATE TAB EC 324 MG (65 MG FE EQUIVALENT) SCH: 324 (65 FE) TABLET DELAYED RESPONSE at 14:16

## 2017-11-24 RX ADMIN — DOCUSATE SODIUM SCH MG: 100 CAPSULE, LIQUID FILLED ORAL at 21:32

## 2017-11-24 RX ADMIN — QUETIAPINE FUMARATE SCH MG: 25 TABLET ORAL at 16:06

## 2017-11-24 RX ADMIN — QUETIAPINE FUMARATE SCH MG: 25 TABLET ORAL at 21:32

## 2017-11-24 RX ADMIN — TRAZODONE HYDROCHLORIDE SCH MG: 50 TABLET ORAL at 21:32

## 2017-11-24 RX ADMIN — SODIUM CHLORIDE SCH MLS/HR: 9 INJECTION, SOLUTION INTRAVENOUS at 06:18

## 2017-11-24 NOTE — PN
Progress Note (short form)





- Note


Progress Note: 





Subjective: The patient was seen and examined at the bedside, she is refusing 

surgery at this time


Psych states the patient is competent to make her own decisions





 Current Medications











Generic Name Dose Route Start Last Admin





  Trade Name Freq  PRN Reason Stop Dose Admin


 


Amino Acids  30 ml  11/22/17 08:00  11/24/17 14:16





  Prosource No Carb Liquid Pkt  PO   Not Given





  DAILY@0800 MARKO   


 


Ascorbic Acid  500 mg  11/22/17 10:00  11/24/17 14:16





  Vitamin C -  PO   Not Given





  DAILY MARKO   


 


Docusate Sodium  300 mg  11/22/17 22:00  11/23/17 21:29





  Colace -  PO   300 mg





  HS MARKO   Administration


 


Ferrous Sulfate  325 mg  11/22/17 10:00  11/24/17 14:16





  Feosol -  PO   Not Given





  DAILY MARKO   


 


Sodium Chloride  1,000 mls @ 75 mls/hr  11/22/17 13:49  11/24/17 06:18





  Normal Saline -  IV   75 mls/hr





  ASDIR MARKO   Administration


 


CEFTRIAXONE 1 G/50 ML PREMIX  50 mls @ 100 mls/hr  11/23/17 10:00  11/24/17 11:

06





  Ceftriaxone 1 Gm-D5w Bag  IVPB   100 mls/hr





  DAILY MARKO   Administration


 


Lactobacillus Acidophilus  1 tab  11/22/17 10:00  11/24/17 14:16





  Bacid -  PO   Not Given





  BID MARKO   


 


Levothyroxine Sodium  150 mcg  11/22/17 07:00  11/24/17 06:18





  Synthroid -  PO   150 mcg





  DAILY@0700 MARKO   Administration


 


Morphine Sulfate  2 mg  11/22/17 02:48  11/22/17 09:57





  Morphine Sulfate  IVPUSH   2 mg





  Q4H PRN   Administration





  PAIN   


 


Pantoprazole Sodium  20 mg  11/22/17 03:45  11/24/17 14:16





  Protonix -  PO   Not Given





  DAILY MARKO   


 


Quetiapine Fumarate  50 mg  11/24/17 22:00  11/24/17 16:06





  Seroquel -  PO   50 mg





  BID MARKO   Administration


 


Trazodone HCl  25 mg  11/22/17 22:00  11/23/17 21:30





  Desyrel -  PO   25 mg





  HS MARKO   Administration








Objective: 


 


 Vital Signs











 Period  Temp  Pulse  Resp  BP Sys/Barriga  Pulse Ox


 


 Last 24 Hr  97.9 F-98.4 F  102-109  18-20  105-112/60-71  94








Physical Exam: 


General: NAD, A&Ox2


Lungs: CTA bilaterally


Heart:

## 2017-11-24 NOTE — PN
Progress Note (short form)





- Note


Progress Note: 





OR TEAMREADY TO GO TO TAKE PATIENT TO THE OR.  PATIENT NOW REFUSES SURGERY 

TODAY AND POSSIBLY FOREVER.  MULTIPLE MEMEBERS OF THE TEAM INCLUDING MYSELF, 

ANESTHESIA, NURSES AND SISTER TALKED TO HER BUT SHE WAS ADAMANT AND REFUSED.  

pSYCHOLOGY CALLED TO EVALUATE COMPETENCE

## 2017-11-24 NOTE — PN
Progress Note (short form)





- Note


Progress Note: 





Renal follow up for SKIP





Pt seen and examined at the bedside


awake and alert


no sob, chest pain, abd pain


has mild discomfort at the hip


making urine





 


 Vital Signs











Temperature  97.9 F   11/24/17 15:30


 


Pulse Rate  102 H  11/24/17 15:30


 


Respiratory Rate  18   11/24/17 15:30


 


Blood Pressure  112/71   11/24/17 15:30


 


O2 Sat by Pulse Oximetry (%)  94 L  11/23/17 21:00








 Intake & Output











 11/21/17 11/22/17 11/23/17 11/24/17





 23:59 23:59 23:59 23:59


 


Intake Total  2025 2300 1150


 


Output Total  600 1400 1300


 


Balance  1425 900 -150


 


Weight 43.091 kg 41.413 kg  











NAD, awake and alert


RRR


CTA


soft NT/ND


No LE edema





 


 CBC, BMP





 11/24/17 06:00 





 11/24/17 06:00 





 Current Medications





Amino Acids (Prosource No Carb Liquid Pkt)  30 ml PO DAILY@0800 [facility]


   Last Admin: 11/24/17 14:16 Dose:  Not Given


Ascorbic Acid (Vitamin C -)  500 mg PO DAILY [facility]


   Last Admin: 11/24/17 14:16 Dose:  Not Given


Docusate Sodium (Colace -)  300 mg PO HS [facility]


   Last Admin: 11/23/17 21:29 Dose:  300 mg


Ferrous Sulfate (Feosol -)  325 mg PO DAILY [facility]


   Last Admin: 11/24/17 14:16 Dose:  Not Given


Sodium Chloride (Normal Saline -)  1,000 mls @ 75 mls/hr IV ASDIR [facility]


   Last Admin: 11/24/17 06:18 Dose:  75 mls/hr


CEFTRIAXONE 1 G/50 ML PREMIX (Ceftriaxone 1 Gm-D5w Bag)  50 mls @ 100 mls/hr 

IVPB DAILY [facility]


   Last Admin: 11/24/17 11:06 Dose:  100 mls/hr


Lactobacillus Acidophilus (Bacid -)  1 tab PO BID [facility]


   Last Admin: 11/24/17 14:16 Dose:  Not Given


Levothyroxine Sodium (Synthroid -)  150 mcg PO DAILY@0700 [facility]


   Last Admin: 11/24/17 06:18 Dose:  150 mcg


Morphine Sulfate (Morphine Sulfate)  2 mg IVPUSH Q4H PRN


   PRN Reason: PAIN


   Last Admin: 11/22/17 09:57 Dose:  2 mg


Pantoprazole Sodium (Protonix -)  20 mg PO DAILY [facility]O


   Last Admin: 11/24/17 14:16 Dose:  Not Given


Quetiapine Fumarate (Seroquel -)  50 mg PO BID MARKO


Trazodone HCl (Desyrel -)  25 mg PO HS MARKO


   Last Admin: 11/23/17 21:30 Dose:  25 mg











72 year old woman with PMhx of Dementia, Frequent falls, Right Femoral Head Fx 

11/2/2017 (refused Sx) presented with fall with left femoral fracture with BUN/

Cr of 64/1.9.





#Acute on Chronic Renal Insufficiency


Baseline Cr unknown, was ~1.5 last admission


Renal function now improved and pt is non-oliguric 


can d/c IVF 


oral intake as tolerated


Ct of the Abd and Pelvis is pending for eval of right sided hydronephrosis





#Hyponatremia


improved to normal


oral intake as tolerated





#Hip Fracture


pain control 


surgical intervention when optimized





#Anemia (acute on chronic)


Check iron studies


transfuse PRN





#dementia


supportive care





Thank you 


Will follow

## 2017-11-24 NOTE — CON.PSY
Psychiatry Consult


Chief Complaint: Abram is a resident of [facility], admitted with 

Fractureed Hip following a fall. Patient apparantly is refusing Surgery. seen 

for capacity to make decisions.


Symptoms: reports: Anxiety





- Previous Psychiatric Treatment


Outpatient: None


Inpatient: None





- Previous Substance Abuse Treatment


Outpatient: None


Inpatient: None





- Reason for Previous Treatment


Reason for Previous Treatment: Anxiety or Panic Disorder





- Current Medications


Current Medications: 


Active Medications





Amino Acids (Prosource No Carb Liquid Pkt)  30 ml PO DAILY@0800 [facility]


   Last Admin: 17 09:24 Dose:  30 ml


Ascorbic Acid (Vitamin C -)  500 mg PO DAILY [facility]


   Last Admin: 17 09:24 Dose:  500 mg


Docusate Sodium (Colace -)  300 mg PO HS [facility]


   Last Admin: 17 21:29 Dose:  300 mg


Ferrous Sulfate (Feosol -)  325 mg PO DAILY [facility]


   Last Admin: 17 09:25 Dose:  325 mg


Sodium Chloride (Normal Saline -)  1,000 mls @ 75 mls/hr IV ASDIR [facility]


   Last Admin: 17 06:18 Dose:  75 mls/hr


CEFTRIAXONE 1 G/50 ML PREMIX (Ceftriaxone 1 Gm-D5w Bag)  50 mls @ 100 mls/hr 

IVPB DAILY [facility]


   Last Admin: 17 11:06 Dose:  100 mls/hr


Lactobacillus Acidophilus (Bacid -)  1 tab PO BID [facility]


   Last Admin: 17 21:28 Dose:  1 tab


Levothyroxine Sodium (Synthroid -)  150 mcg PO DAILY@0700 [facility]


   Last Admin: 17 06:18 Dose:  150 mcg


Morphine Sulfate (Morphine Sulfate)  2 mg IVPUSH Q4H PRN


   PRN Reason: PAIN


   Last Admin: 17 09:57 Dose:  2 mg


Pantoprazole Sodium (Protonix -)  20 mg PO DAILY [facility]


   Last Admin: 17 09:25 Dose:  20 mg


Quetiapine Fumarate (Seroquel -)  37.5 mg PO BID [facility]


   Last Admin: 17 21:27 Dose:  37.5 mg


Trazodone HCl (Desyrel -)  25 mg PO HS [facility]


   Last Admin: 17 21:30 Dose:  25 mg











- Allergies


Allergies: 


 Allergies











Allergy/AdvReac Type Severity Reaction Status Date / Time


 


No Known Allergies Allergy   Verified 17 23:49














- Current Living Status


Usual Living Arrangement: Nursing Home





- Current Mental Status Evaluation


Appearance: Disheveled


Attitude: Guarded





- Affect


Affect: Constrictive


Appropriateness: Appropriate to Content





- Mood


Mood: Anxious, Irritable





- Speech/Language


Expressive: Coherent





- Psychomotor Activity


Psychomotor Activity: Hyperactive





- Thought Process


Thought Process: Circumstantial





- Thought Content


Hallucinations: Absent


Delusions: Absent





- Self Perception


Self Perception: No Impairment





- Cognition


Attention: Alert


Memory, Immediate Recall: Intact


Memory, Short Term: 2/3


Memory, Remote with Promptin/3





- Concentration


Serial Sevens Intact: No


Simple Calculations Intact: No





- Abstraction


Proverb Interpretation: Intact


Judgement: Minimally Impaired





- Insight


Insight: Intact





- Impulse Control


Impulse Control: Minimally Impaired





- Suicidal Ideation


Suicidal Ideation: No





- Homicidal Ideation


Homicidal Ideation: No





Assessment/Plan





1) Patient has the functional capacity to make decisions at this time.


2) will increse SEroqul dose.


3) Patient is willing ti have Surgery but needs to be talked to and have her 

anxieties answered.

## 2017-11-25 LAB
ANION GAP SERPL CALC-SCNC: 10 MMOL/L (ref 8–16)
CALCIUM SERPL-MCNC: 7.5 MG/DL (ref 8.5–10.1)
CO2 SERPL-SCNC: 24 MMOL/L (ref 21–32)
CREAT SERPL-MCNC: 0.9 MG/DL (ref 0.55–1.02)
GLUCOSE SERPL-MCNC: 100 MG/DL (ref 74–106)
MAGNESIUM SERPL-MCNC: 2.2 MG/DL (ref 1.8–2.4)

## 2017-11-25 RX ADMIN — OXYCODONE HYDROCHLORIDE AND ACETAMINOPHEN SCH MG: 500 TABLET ORAL at 09:30

## 2017-11-25 RX ADMIN — PANTOPRAZOLE SODIUM SCH MG: 20 TABLET, DELAYED RELEASE ORAL at 09:30

## 2017-11-25 RX ADMIN — FERROUS SULFATE TAB EC 324 MG (65 MG FE EQUIVALENT) SCH MG: 324 (65 FE) TABLET DELAYED RESPONSE at 09:30

## 2017-11-25 RX ADMIN — QUETIAPINE FUMARATE SCH MG: 25 TABLET ORAL at 22:08

## 2017-11-25 RX ADMIN — TRAZODONE HYDROCHLORIDE SCH MG: 50 TABLET ORAL at 22:07

## 2017-11-25 RX ADMIN — SODIUM CHLORIDE SCH MLS/HR: 9 INJECTION, SOLUTION INTRAVENOUS at 09:29

## 2017-11-25 RX ADMIN — SODIUM CHLORIDE SCH MLS/HR: 9 INJECTION, SOLUTION INTRAVENOUS at 22:08

## 2017-11-25 RX ADMIN — LEVOTHYROXINE SODIUM SCH MCG: 75 TABLET ORAL at 06:15

## 2017-11-25 RX ADMIN — DOCUSATE SODIUM SCH: 100 CAPSULE, LIQUID FILLED ORAL at 22:08

## 2017-11-25 RX ADMIN — CEFTRIAXONE SCH MLS/HR: 1 INJECTION, SOLUTION INTRAVENOUS at 09:29

## 2017-11-25 RX ADMIN — Medication SCH ML: at 09:29

## 2017-11-25 RX ADMIN — Medication SCH TAB: at 22:08

## 2017-11-25 RX ADMIN — QUETIAPINE FUMARATE SCH MG: 25 TABLET ORAL at 09:30

## 2017-11-25 RX ADMIN — Medication SCH TAB: at 09:29

## 2017-11-25 NOTE — PN
Progress Note, Physician


History of Present Illness: 





Patient still debating surgery (seemed leaning towards surgery, but keeps 

changing her mind).  Appreciate psychiatry input (has capacity to decide on 

surgery).  Denies any pain currently.





- Current Medication List


Current Medications: 


Active Medications





Amino Acids (Prosource No Carb Liquid Pkt)  30 ml PO DAILY@0800 [facility]


   Last Admin: 11/25/17 09:29 Dose:  30 ml


Ascorbic Acid (Vitamin C -)  500 mg PO DAILY [facility]


   Last Admin: 11/25/17 09:30 Dose:  500 mg


Docusate Sodium (Colace -)  300 mg PO HS [facility]


   Last Admin: 11/24/17 21:32 Dose:  300 mg


Ferrous Sulfate (Feosol -)  325 mg PO DAILY [facility]


   Last Admin: 11/25/17 09:30 Dose:  325 mg


Sodium Chloride (Normal Saline -)  1,000 mls @ 75 mls/hr IV ASDIR [facility]


   Last Admin: 11/25/17 09:29 Dose:  75 mls/hr


CEFTRIAXONE 1 G/50 ML PREMIX (Ceftriaxone 1 Gm-D5w Bag)  50 mls @ 100 mls/hr 

IVPB DAILY [facility]


   Last Admin: 11/25/17 09:29 Dose:  100 mls/hr


Lactobacillus Acidophilus (Bacid -)  1 tab PO BID [facility]


   Last Admin: 11/25/17 09:29 Dose:  1 tab


Levothyroxine Sodium (Synthroid -)  150 mcg PO DAILY@0700 [facility]


   Last Admin: 11/25/17 06:15 Dose:  150 mcg


Morphine Sulfate (Morphine Sulfate)  2 mg IVPUSH Q4H PRN


   PRN Reason: PAIN


   Last Admin: 11/22/17 09:57 Dose:  2 mg


Pantoprazole Sodium (Protonix -)  20 mg PO DAILY [facility]


   Last Admin: 11/25/17 09:30 Dose:  20 mg


Quetiapine Fumarate (Seroquel -)  50 mg PO BID [facility]


   Last Admin: 11/25/17 09:30 Dose:  50 mg


Trazodone HCl (Desyrel -)  25 mg PO [facility]


   Last Admin: 11/24/17 21:32 Dose:  25 mg











- Objective


Vital Signs: 


 Vital Signs











Temperature  98.8 F   11/25/17 05:57


 


Pulse Rate  100 H  11/25/17 05:57


 


Respiratory Rate  18   11/25/17 05:57


 


Blood Pressure  100/58   11/25/17 05:57


 


O2 Sat by Pulse Oximetry (%)  94 L  11/24/17 21:00











Constitutional: Yes: No Distress, Calm


Neck: Yes: Supple, Trachea Midline


Cardiovascular: Yes: Regular Rate and Rhythm, S1, S2.  No: Murmur


Respiratory: Yes: Regular, CTA Bilaterally.  No: Rales, Rhonchi, Wheezes


Gastrointestinal: Yes: Normal Bowel Sounds, Soft.  No: Tenderness, Vomiting


Edema: No


Labs: 


 CBC, BMP





 11/24/17 06:00 





 11/25/17 05:17 





 INR, PTT











INR  1.19  (0.82-1.09)  H  11/22/17  02:23    














Assessment/Plan





Current Active Problems





Anemia (Acute)


Fracture of left hip (Acute)


UTI (urinary tract infection) (Acute)


GAVIN, now resolved


hypothyroid





-patient still deciding on surgery, so if agrees then OR in next 2 days

## 2017-11-25 NOTE — PN
Progress Note, Physician


Chief Complaint: 





The patient seen in her room. 


Confused, comfortable 


Urine output reportedly adequate. 


History of Present Illness: 





72 year old woman with PMhx of Dementia, Frequent falls, Right Femoral Head Fx 

11/2/2017 (refused Sx) presented with fall with left femoral fracture with BUN/

Cr of 64/1.9.


Renal functions improving with hydration





- Current Medication List


Current Medications: 


Active Medications





Amino Acids (Prosource No Carb Liquid Pkt)  30 ml PO DAILY@0800 [facility]


   Last Admin: 11/25/17 09:29 Dose:  30 ml


Ascorbic Acid (Vitamin C -)  500 mg PO DAILY [facility]


   Last Admin: 11/25/17 09:30 Dose:  500 mg


Docusate Sodium (Colace -)  300 mg PO HS [facility]


   Last Admin: 11/24/17 21:32 Dose:  300 mg


Ferrous Sulfate (Feosol -)  325 mg PO DAILY [facility]


   Last Admin: 11/25/17 09:30 Dose:  325 mg


Sodium Chloride (Normal Saline -)  1,000 mls @ 75 mls/hr IV ASDIR [facility]


   Last Admin: 11/25/17 09:29 Dose:  75 mls/hr


CEFTRIAXONE 1 G/50 ML PREMIX (Ceftriaxone 1 Gm-D5w Bag)  50 mls @ 100 mls/hr 

IVPB DAILY [facility]


   Last Admin: 11/25/17 09:29 Dose:  100 mls/hr


Lactobacillus Acidophilus (Bacid -)  1 tab PO BID [facility]


   Last Admin: 11/25/17 09:29 Dose:  1 tab


Levothyroxine Sodium (Synthroid -)  150 mcg PO DAILY@0700 [facility]


   Last Admin: 11/25/17 06:15 Dose:  150 mcg


Morphine Sulfate (Morphine Sulfate)  2 mg IVPUSH Q4H PRN


   PRN Reason: PAIN


   Last Admin: 11/22/17 09:57 Dose:  2 mg


Pantoprazole Sodium (Protonix -)  20 mg PO DAILY [facility]


   Last Admin: 11/25/17 09:30 Dose:  20 mg


Quetiapine Fumarate (Seroquel -)  50 mg PO BID [facility]


   Last Admin: 11/25/17 09:30 Dose:  50 mg


Trazodone HCl (Desyrel -)  25 mg PO HS [facility]


   Last Admin: 11/24/17 21:32 Dose:  25 mg











- Objective


Vital Signs: 


 Vital Signs











Temperature  98.8 F   11/25/17 05:57


 


Pulse Rate  100 H  11/25/17 05:57


 


Respiratory Rate  18   11/25/17 05:57


 


Blood Pressure  100/58   11/25/17 05:57


 


O2 Sat by Pulse Oximetry (%)  94 L  11/24/17 21:00











Constitutional: Yes: Calm


HENT: Yes: Atraumatic


Neck: Yes: Trachea Midline


Cardiovascular: Yes: S1, S2


Respiratory: Yes: CTA Bilaterally, Diminished


Gastrointestinal: Yes: Normal Bowel Sounds


Neurological: Yes: Confusion


Labs: 


 CBC, BMP





 11/24/17 06:00 





 11/25/17 05:17 





 INR, PTT











INR  1.19  (0.82-1.09)  H  11/22/17  02:23    














Problem List





- Problems


(1) Acute kidney failure


Code(s): N17.9 - ACUTE KIDNEY FAILURE, UNSPECIFIED   





(2) Anemia


Code(s): D64.9 - ANEMIA, UNSPECIFIED   


Qualifiers: 


   Anemia type: unspecified type   Qualified Code(s): D64.9 - Anemia, 

unspecified   





(3) Fracture of left hip


Code(s): S72.002A - FRACTURE OF UNSP PART OF NECK OF LEFT FEMUR, INIT   


Qualifiers: 


   Encounter type: initial encounter   Fracture type: closed   Qualified Code(s)

: S72.002A - Fracture of unspecified part of neck of left femur, initial 

encounter for closed fracture   





(4) UTI (urinary tract infection)


Code(s): N39.0 - URINARY TRACT INFECTION, SITE NOT SPECIFIED   


Qualifiers: 


   Urinary tract infection type: acute cystitis   Hematuria presence: without 

hematuria   Qualified Code(s): N30.00 - Acute cystitis without hematuria   





Assessment/Plan





72 year old woman with PMhx of Dementia, Frequent falls, Right Femoral Head Fx 

11/2/2017 (refused Sx) presented with fall with left femoral fracture with BUN/

Cr of 64/1.9.





Acute on Chronic Renal Insufficiency


The renal functions have improved to her baseline. 


The CT scan ordered to evaluate the Chronic unilateral Hydronephrosis is 

pending. 





Hyponatremia


improved to normal


oral intake as tolerated





Hip Fracture


pain control 


Surgical intervention when optimized





Anemia (acute on chronic)


Seems stable at this point. 





Dementia


supportive care





Will follow with you.





Sonia Urias MD

## 2017-11-25 NOTE — PN
Progress Note (short form)





- Note


Progress Note: 





FULL CONSULT DICTATED





IMP: NEEDS SURGERY BUT KEEPS REFUSING





PLAN:  -->OR ON MONDAY.  IF SHE STILL REFUSES--->SNF

## 2017-11-26 RX ADMIN — OXYCODONE HYDROCHLORIDE AND ACETAMINOPHEN SCH: 500 TABLET ORAL at 09:36

## 2017-11-26 RX ADMIN — LEVOTHYROXINE SODIUM SCH MCG: 75 TABLET ORAL at 06:07

## 2017-11-26 RX ADMIN — SODIUM CHLORIDE SCH MLS/HR: 9 INJECTION, SOLUTION INTRAVENOUS at 21:48

## 2017-11-26 RX ADMIN — CEFTRIAXONE SCH MLS/HR: 1 INJECTION, SOLUTION INTRAVENOUS at 10:14

## 2017-11-26 RX ADMIN — POLYETHYLENE GLYCOL 3350 SCH GRAMS: 17 POWDER, FOR SOLUTION ORAL at 10:14

## 2017-11-26 RX ADMIN — FERROUS SULFATE TAB EC 324 MG (65 MG FE EQUIVALENT) SCH: 324 (65 FE) TABLET DELAYED RESPONSE at 09:36

## 2017-11-26 RX ADMIN — Medication SCH TAB: at 09:36

## 2017-11-26 RX ADMIN — QUETIAPINE FUMARATE SCH MG: 25 TABLET ORAL at 09:36

## 2017-11-26 RX ADMIN — QUETIAPINE FUMARATE SCH: 25 TABLET ORAL at 21:49

## 2017-11-26 RX ADMIN — DOCUSATE SODIUM SCH: 100 CAPSULE, LIQUID FILLED ORAL at 21:48

## 2017-11-26 RX ADMIN — Medication SCH: at 21:48

## 2017-11-26 RX ADMIN — TRAZODONE HYDROCHLORIDE SCH: 50 TABLET ORAL at 21:49

## 2017-11-26 RX ADMIN — Medication SCH ML: at 09:36

## 2017-11-26 RX ADMIN — PANTOPRAZOLE SODIUM SCH MG: 20 TABLET, DELAYED RELEASE ORAL at 09:35

## 2017-11-26 NOTE — PN
Progress Note, Physician


Chief Complaint: 





The patient seen in her room. 


No new complaints. Refusing surgery 


urine output adequate. 


History of Present Illness: 





72 year old woman with PMhx of Dementia, Frequent falls, Right Femoral Head Fx 

11/2/2017 (refused Sx) presented with fall with left femoral fracture with BUN/

Cr of 64/1.9 on admission. 





Renal functions improving with hydration





The patient is refusing surgery now. 





- Current Medication List


Current Medications: 


Active Medications





Amino Acids (Prosource No Carb Liquid Pkt)  30 ml PO DAILY@0800 [facility]


   Last Admin: 11/26/17 09:36 Dose:  30 ml


Ascorbic Acid (Vitamin C -)  500 mg PO DAILY [facility]


   Last Admin: 11/26/17 09:36 Dose:  Not Given


Docusate Sodium (Colace -)  300 mg PO HS [facility]


   Last Admin: 11/25/17 22:08 Dose:  Not Given


Ferrous Sulfate (Feosol -)  325 mg PO DAILY [facility]


   Last Admin: 11/26/17 09:36 Dose:  Not Given


Sodium Chloride (Normal Saline -)  1,000 mls @ 75 mls/hr IV ASDIR [facility]


   Last Admin: 11/25/17 22:08 Dose:  75 mls/hr


CEFTRIAXONE 1 G/50 ML PREMIX (Ceftriaxone 1 Gm-D5w Bag)  50 mls @ 100 mls/hr 

IVPB DAILY [facility]


   Last Admin: 11/26/17 10:14 Dose:  100 mls/hr


Lactobacillus Acidophilus (Bacid -)  1 tab PO BID [facility]


   Last Admin: 11/26/17 09:36 Dose:  1 tab


Levothyroxine Sodium (Synthroid -)  150 mcg PO DAILY@0700 [facility]


   Last Admin: 11/26/17 06:07 Dose:  150 mcg


Morphine Sulfate (Morphine Sulfate)  2 mg IVPUSH Q4H PRN


   PRN Reason: PAIN


   Last Admin: 11/22/17 09:57 Dose:  2 mg


Pantoprazole Sodium (Protonix -)  20 mg PO DAILY [facility]


   Last Admin: 11/26/17 09:35 Dose:  20 mg


Polyethylene Glycol (Miralax (For Daily Use) -)  17 gm PO DAILY [facility]


   Last Admin: 11/26/17 10:14 Dose:  17 grams


Quetiapine Fumarate (Seroquel -)  50 mg PO BID [facility]


   Last Admin: 11/26/17 09:36 Dose:  50 mg


Trazodone HCl (Desyrel -)  25 mg PO HS [facility]


   Last Admin: 11/25/17 22:07 Dose:  25 mg











- Objective


Vital Signs: 


 Vital Signs











Temperature  99.1 F   11/26/17 14:02


 


Pulse Rate  107 H  11/26/17 14:02


 


Respiratory Rate  18   11/26/17 14:02


 


Blood Pressure  104/76   11/26/17 14:02


 


O2 Sat by Pulse Oximetry (%)  94 L  11/26/17 09:00











Constitutional: Yes: No Distress, Calm


Eyes: Yes: Conjunctiva Clear


Neck: Yes: Trachea Midline


Cardiovascular: Yes: S1, S2


Respiratory: Yes: CTA Bilaterally, Diminished


Gastrointestinal: Yes: Normal Bowel Sounds


Musculoskeletal: Yes: Back Pain


Extremities: Yes: Deformity, Internal Rotation, Shortened


Neurological: Yes: Confusion


Labs: 


 CBC, BMP





 11/24/17 06:00 





 11/25/17 05:17 





 INR, PTT











INR  1.19  (0.82-1.09)  H  11/22/17  02:23    














Problem List





- Problems


(1) Acute kidney failure


Code(s): N17.9 - ACUTE KIDNEY FAILURE, UNSPECIFIED   





(2) Anemia


Code(s): D64.9 - ANEMIA, UNSPECIFIED   


Qualifiers: 


   Anemia type: unspecified type   Qualified Code(s): D64.9 - Anemia, 

unspecified   





(3) Fracture of left hip


Code(s): S72.002A - FRACTURE OF UNSP PART OF NECK OF LEFT FEMUR, INIT   


Qualifiers: 


   Encounter type: initial encounter   Fracture type: closed   Qualified Code(s)

: S72.002A - Fracture of unspecified part of neck of left femur, initial 

encounter for closed fracture   





(4) UTI (urinary tract infection)


Code(s): N39.0 - URINARY TRACT INFECTION, SITE NOT SPECIFIED   


Qualifiers: 


   Urinary tract infection type: acute cystitis   Hematuria presence: without 

hematuria   Qualified Code(s): N30.00 - Acute cystitis without hematuria   





Assessment/Plan





72 year old woman with PMhx of Dementia, Frequent falls, Right Femoral Head Fx 

11/2/2017 presented with fall with left femoral fracture .





Acute on Chronic Renal Insufficiency


The renal functions have improved to her baseline. 


The CT scan ordered to evaluate the Chronic unilateral Hydronephrosis is 

pending. 





Hyponatremia


Has improved to normal. Oral intake as tolerated





Hip Fracture


pain control 


The patient needs surgery. 





Anemia (acute on chronic).  Seems stable at this point. 





Chronic Hydronephrosis. Awaiting CT scan. 





Dementia


supportive care





Will follow with you.





Sonia Urias MD

## 2017-11-26 NOTE — PN
DATE OF VISIT:  11/25/2017

 

The patient, today, adamantly refuses to have surgery again.  When requested earlier,

she told her medical doctor that she wanted surgery, but now she is saying that she

does not want surgery.  I am at a quandary now.  At this point, the patient keeps

giving conflicting opinions on what to do for her.  Psychiatry has seen the patient;

has seen the patient competent to make decisions on her own.  So, at this point, we

will just continue to medically optimize her and I will try one more time to book her

for surgery on Monday.  If she consents on that day, we will do her; otherwise, we

will transfer her back to the nursing home. 

 

 

PRATIBHA LIND M.D.

 

[id number]

DD: 11/25/2017 18:40

DT: 11/26/2017 07:58

Job #:  [id number]

## 2017-11-26 NOTE — PN
Progress Note, Physician


History of Present Illness: 





Patient now refusing surgery for hip. c/o cramping in abdomen, but no pain in 

hip currently.





- Current Medication List


Current Medications: 


Active Medications





Amino Acids (Prosource No Carb Liquid Pkt)  30 ml PO DAILY@0800 [facility]


   Last Admin: 11/26/17 09:36 Dose:  30 ml


Ascorbic Acid (Vitamin C -)  500 mg PO DAILY [facility]


   Last Admin: 11/26/17 09:36 Dose:  Not Given


Docusate Sodium (Colace -)  300 mg PO HS [facility]


   Last Admin: 11/25/17 22:08 Dose:  Not Given


Ferrous Sulfate (Feosol -)  325 mg PO DAILY [facility]


   Last Admin: 11/26/17 09:36 Dose:  Not Given


Sodium Chloride (Normal Saline -)  1,000 mls @ 75 mls/hr IV ASDIR [facility]


   Last Admin: 11/25/17 22:08 Dose:  75 mls/hr


CEFTRIAXONE 1 G/50 ML PREMIX (Ceftriaxone 1 Gm-D5w Bag)  50 mls @ 100 mls/hr 

IVPB DAILY [facility]


   Last Admin: 11/26/17 10:14 Dose:  100 mls/hr


Lactobacillus Acidophilus (Bacid -)  1 tab PO BID [facility]


   Last Admin: 11/26/17 09:36 Dose:  1 tab


Levothyroxine Sodium (Synthroid -)  150 mcg PO DAILY@0700 [facility]


   Last Admin: 11/26/17 06:07 Dose:  150 mcg


Morphine Sulfate (Morphine Sulfate)  2 mg IVPUSH Q4H PRN


   PRN Reason: PAIN


   Last Admin: 11/22/17 09:57 Dose:  2 mg


Pantoprazole Sodium (Protonix -)  20 mg PO DAILY [facility]


   Last Admin: 11/26/17 09:35 Dose:  20 mg


Polyethylene Glycol (Miralax (For Daily Use) -)  17 gm PO DAILY [facility]


   Last Admin: 11/26/17 10:14 Dose:  17 grams


Quetiapine Fumarate (Seroquel -)  50 mg PO BID [facility]


   Last Admin: 11/26/17 09:36 Dose:  50 mg


Trazodone HCl (Desyrel -)  25 mg PO HS [facility]


   Last Admin: 11/25/17 22:07 Dose:  25 mg











- Objective


Vital Signs: 


 Vital Signs











Temperature  98.2 F   11/26/17 08:24


 


Pulse Rate  101 H  11/26/17 08:24


 


Respiratory Rate  18   11/26/17 08:24


 


Blood Pressure  110/69   11/26/17 08:24


 


O2 Sat by Pulse Oximetry (%)  94 L  11/26/17 09:00











Constitutional: Yes: No Distress, Calm


Neck: Yes: Supple, Trachea Midline


Cardiovascular: Yes: Regular Rate and Rhythm, S1, S2.  No: Murmur


Respiratory: Yes: Regular, CTA Bilaterally.  No: Rales, Rhonchi, Wheezes


Gastrointestinal: Yes: Normal Bowel Sounds, Soft.  No: Distention, Tenderness


Edema: No


Neurological: Yes: Alert, Oriented


Labs: 


 CBC, BMP





 11/24/17 06:00 





 11/25/17 05:17 





 INR, PTT











INR  1.19  (0.82-1.09)  H  11/22/17  02:23    














Assessment/Plan





Current Active Problems





Anemia (Acute)


Fracture of left hip (Acute)


UTI (urinary tract infection) (Acute)


GAVIN, now resolved


hypothyroid





-now refusing surgery


-completing UTI abx treatment, then consider transfer back to [facility]

## 2017-11-27 LAB
ALBUMIN SERPL-MCNC: 1.7 G/DL (ref 3.4–5)
ALP SERPL-CCNC: 87 U/L (ref 45–117)
ALT SERPL-CCNC: 74 U/L (ref 12–78)
ANION GAP SERPL CALC-SCNC: 11 MMOL/L (ref 8–16)
AST SERPL-CCNC: 53 U/L (ref 15–37)
BASOPHILS # BLD: 0.6 % (ref 0–2)
BILIRUB SERPL-MCNC: 0.3 MG/DL (ref 0.2–1)
CALCIUM SERPL-MCNC: 7.2 MG/DL (ref 8.5–10.1)
CO2 SERPL-SCNC: 24 MMOL/L (ref 21–32)
CREAT SERPL-MCNC: 0.9 MG/DL (ref 0.55–1.02)
DEPRECATED RDW RBC AUTO: 15.8 % (ref 11.6–15.6)
EOSINOPHIL # BLD: 1.6 % (ref 0–4.5)
GLUCOSE SERPL-MCNC: 109 MG/DL (ref 74–106)
MCH RBC QN AUTO: 28.9 PG (ref 25.7–33.7)
MCHC RBC AUTO-ENTMCNC: 33.7 G/DL (ref 32–36)
MCV RBC: 85.6 FL (ref 80–96)
NEUTROPHILS # BLD: 76.5 % (ref 42.8–82.8)
PLATELET # BLD AUTO: 386 K/MM3 (ref 134–434)
PMV BLD: 7.6 FL (ref 7.5–11.1)
PROT SERPL-MCNC: 5.4 G/DL (ref 6.4–8.2)
WBC # BLD AUTO: 8.8 K/MM3 (ref 4–10)

## 2017-11-27 RX ADMIN — CEFTRIAXONE SCH MLS/HR: 1 INJECTION, SOLUTION INTRAVENOUS at 09:25

## 2017-11-27 RX ADMIN — POLYETHYLENE GLYCOL 3350 SCH: 17 POWDER, FOR SOLUTION ORAL at 09:19

## 2017-11-27 RX ADMIN — PANTOPRAZOLE SODIUM SCH: 20 TABLET, DELAYED RELEASE ORAL at 09:19

## 2017-11-27 RX ADMIN — Medication SCH TAB: at 11:28

## 2017-11-27 RX ADMIN — QUETIAPINE FUMARATE SCH: 25 TABLET ORAL at 09:19

## 2017-11-27 RX ADMIN — DOCUSATE SODIUM SCH MG: 100 CAPSULE, LIQUID FILLED ORAL at 21:19

## 2017-11-27 RX ADMIN — QUETIAPINE FUMARATE SCH MG: 25 TABLET ORAL at 11:29

## 2017-11-27 RX ADMIN — CEPHALEXIN SCH MG: 500 CAPSULE ORAL at 21:18

## 2017-11-27 RX ADMIN — CEPHALEXIN SCH: 500 CAPSULE ORAL at 09:00

## 2017-11-27 RX ADMIN — TRAZODONE HYDROCHLORIDE SCH MG: 50 TABLET ORAL at 21:18

## 2017-11-27 RX ADMIN — OXYCODONE HYDROCHLORIDE AND ACETAMINOPHEN SCH: 500 TABLET ORAL at 09:19

## 2017-11-27 RX ADMIN — CEPHALEXIN SCH MG: 500 CAPSULE ORAL at 11:28

## 2017-11-27 RX ADMIN — SODIUM CHLORIDE SCH MLS/HR: 9 INJECTION, SOLUTION INTRAVENOUS at 09:28

## 2017-11-27 RX ADMIN — Medication SCH: at 08:16

## 2017-11-27 RX ADMIN — LEVOTHYROXINE SODIUM SCH: 75 TABLET ORAL at 06:09

## 2017-11-27 RX ADMIN — Medication SCH TAB: at 21:18

## 2017-11-27 RX ADMIN — FERROUS SULFATE TAB EC 324 MG (65 MG FE EQUIVALENT) SCH: 324 (65 FE) TABLET DELAYED RESPONSE at 09:18

## 2017-11-27 RX ADMIN — OXYCODONE HYDROCHLORIDE AND ACETAMINOPHEN SCH MG: 500 TABLET ORAL at 11:28

## 2017-11-27 RX ADMIN — Medication SCH: at 09:18

## 2017-11-27 RX ADMIN — Medication SCH ML: at 11:28

## 2017-11-27 RX ADMIN — QUETIAPINE FUMARATE SCH MG: 25 TABLET ORAL at 21:18

## 2017-11-27 RX ADMIN — FERROUS SULFATE TAB EC 324 MG (65 MG FE EQUIVALENT) SCH MG: 324 (65 FE) TABLET DELAYED RESPONSE at 11:28

## 2017-11-27 RX ADMIN — PANTOPRAZOLE SODIUM SCH MG: 20 TABLET, DELAYED RELEASE ORAL at 11:28

## 2017-11-27 NOTE — PN
Progress Note (short form)





- Note


Progress Note: 





Patient refusing surgery for hip. 


Discussed in detail with patient but refusing surgery.








- Current Medication List


Current Medications: 


Active Medications





Amino Acids (Prosource No Carb Liquid Pkt)  30 ml PO DAILY@0800 [facility]


   Last Admin: 11/26/17 09:36 Dose:  30 ml


Ascorbic Acid (Vitamin C -)  500 mg PO DAILY [facility]


   Last Admin: 11/26/17 09:36 Dose:  Not Given


Docusate Sodium (Colace -)  300 mg PO HS [facility]


   Last Admin: 11/25/17 22:08 Dose:  Not Given


Ferrous Sulfate (Feosol -)  325 mg PO DAILY [facility]


   Last Admin: 11/26/17 09:36 Dose:  Not Given


Sodium Chloride (Normal Saline -)  1,000 mls @ 75 mls/hr IV ASDIR [facility]


   Last Admin: 11/25/17 22:08 Dose:  75 mls/hr


CEFTRIAXONE 1 G/50 ML PREMIX (Ceftriaxone 1 Gm-D5w Bag)  50 mls @ 100 mls/hr 

IVPB DAILY [facility]


   Last Admin: 11/26/17 10:14 Dose:  100 mls/hr


Lactobacillus Acidophilus (Bacid -)  1 tab PO BID [facility]


   Last Admin: 11/26/17 09:36 Dose:  1 tab


Levothyroxine Sodium (Synthroid -)  150 mcg PO DAILY@0700 [facility]


   Last Admin: 11/26/17 06:07 Dose:  150 mcg


Morphine Sulfate (Morphine Sulfate)  2 mg IVPUSH Q4H PRN


   PRN Reason: PAIN


   Last Admin: 11/22/17 09:57 Dose:  2 mg


Pantoprazole Sodium (Protonix -)  20 mg PO DAILY [facility]


   Last Admin: 11/26/17 09:35 Dose:  20 mg


Polyethylene Glycol (Miralax (For Daily Use) -)  17 gm PO DAILY [facility]


   Last Admin: 11/26/17 10:14 Dose:  17 grams


Quetiapine Fumarate (Seroquel -)  50 mg PO BID [facility]


   Last Admin: 11/26/17 09:36 Dose:  50 mg


Trazodone HCl (Desyrel -)  25 mg PO HS [facility]


   Last Admin: 11/25/17 22:07 Dose:  25 mg











- Objective


Vital Signs: 


 


 Vital Signs











 Period  Temp  Pulse  Resp  BP Sys/Barriga  Pulse Ox


 


 Last 24 Hr  98.2 F-99.1 F    18-20  104-121/70-76  95














Constitutional: Yes: No Distress, Calm


Neck: Yes: Supple, Trachea Midline


Cardiovascular: Yes: Regular Rate and Rhythm, S1, S2.  No: Murmur


Respiratory: Yes: Regular, CTA Bilaterally.  No: Rales, Rhonchi, Wheezes


Gastrointestinal: Yes: Normal Bowel Sounds, Soft.  No: Distention, Tenderness


Edema: No


Neurological: Yes: Alert, Oriented


Labs: 


 


 CBC, BMP





 11/27/17 06:45 





 11/27/17 06:45 














Assessment/Plan





Current Active Problems





Anemia (Acute)


Fracture of left hip (Acute)


UTI (urinary tract infection) (Acute)


GAVIN, now resolved


hypothyroid





-Refusing surgery


-Replete potassium


-CT abdomen & pelvis to be done today before transfer back to NH.


-Transfer back to NH after CTA&P.

## 2017-11-27 NOTE — PN
Progress Note (short form)





- Note


Progress Note: 





Renal follow up for SKIP





Pt seen and examined at the bedside


awake and alert


s/p CT of the Abd/Pelvis


making urine


no sob, chest pain





 


 Vital Signs











Temperature  98.0 F   11/27/17 14:00


 


Pulse Rate  102 H  11/27/17 14:00


 


Respiratory Rate  17   11/27/17 14:00


 


Blood Pressure  103/71   11/27/17 14:00


 


O2 Sat by Pulse Oximetry (%)  95   11/27/17 09:00








 Intake & Output











 11/24/17 11/25/17 11/26/17 11/27/17





 23:59 23:59 23:59 23:59


 


Intake Total 1350 1825 2300 2620


 


Output Total 1750 1500 1175 600


 


Balance - 2020














NAD, awake and alert


RRR


CTA


soft NT/ND


No LE edema





 


 CBC, BMP





 11/27/17 06:45 





 11/27/17 06:45 





 Current Medications





Amino Acids (Prosource No Carb Liquid Pkt)  30 ml PO DAILY@0800 [facility]


   Last Admin: 11/27/17 11:28 Dose:  30 ml


Ascorbic Acid (Vitamin C -)  500 mg PO DAILY [facility]


   Last Admin: 11/27/17 11:28 Dose:  500 mg


Cephalexin HCl (Keflex -)  500 mg PO BID [facility]


   Last Admin: 11/27/17 11:28 Dose:  500 mg


Docusate Sodium (Colace -)  300 mg PO HS [facility]


   Last Admin: 11/26/17 21:48 Dose:  Not Given


Ferrous Sulfate (Feosol -)  325 mg PO DAILY [facility]


   Last Admin: 11/27/17 11:28 Dose:  325 mg


Lactobacillus Acidophilus (Bacid -)  1 tab PO BID [facility]


   Last Admin: 11/27/17 11:28 Dose:  1 tab


Levothyroxine Sodium (Synthroid -)  150 mcg PO DAILY@0700 [facility]


   Last Admin: 11/27/17 06:09 Dose:  Not Given


Morphine Sulfate (Morphine Sulfate)  2 mg IVPUSH Q4H PRN


   PRN Reason: PAIN


   Last Admin: 11/22/17 09:57 Dose:  2 mg


Pantoprazole Sodium (Protonix -)  20 mg PO DAILY [facility]


   Last Admin: 11/27/17 11:28 Dose:  20 mg


Polyethylene Glycol (Miralax (For Daily Use) -)  17 gm PO DAILY [facility]


   Last Admin: 11/27/17 09:19 Dose:  Not Given


Quetiapine Fumarate (Seroquel -)  50 mg PO BID [facility]


   Last Admin: 11/27/17 11:29 Dose:  50 mg


Trazodone HCl (Desyrel -)  25 mg PO HS [facility]


   Last Admin: 11/26/17 21:49 Dose:  Not Given














72 year old woman with PMhx of Dementia, Frequent falls, Right Femoral Head Fx 

11/2/2017 (refused Sx) presented with fall with left femoral fracture with BUN/

Cr of 64/1.9.





#Acute on Chronic Renal Insufficiency


Renal function improved and stable


CT of the Abd showed Chronic appearing hydronephrosis with stones 


no aucte intervention needed but will need urology follow up as a outpatient 





#Rectal Lesion seen on CT


will need GI follow up as outpatient 





#Hip Fracture


pt refusing surgery 








Thank you 


Will follow

## 2017-11-28 VITALS — SYSTOLIC BLOOD PRESSURE: 99 MMHG | DIASTOLIC BLOOD PRESSURE: 65 MMHG | TEMPERATURE: 98.2 F | HEART RATE: 100 BPM

## 2017-11-28 LAB
ANION GAP SERPL CALC-SCNC: 9 MMOL/L (ref 8–16)
CALCIUM SERPL-MCNC: 7.6 MG/DL (ref 8.5–10.1)
CO2 SERPL-SCNC: 27 MMOL/L (ref 21–32)
CREAT SERPL-MCNC: 0.9 MG/DL (ref 0.55–1.02)
GLUCOSE SERPL-MCNC: 120 MG/DL (ref 74–106)
MAGNESIUM SERPL-MCNC: 2.1 MG/DL (ref 1.8–2.4)

## 2017-11-28 RX ADMIN — Medication SCH ML: at 07:42

## 2017-11-28 RX ADMIN — POTASSIUM CHLORIDE SCH: 1500 TABLET, EXTENDED RELEASE ORAL at 14:26

## 2017-11-28 RX ADMIN — PANTOPRAZOLE SODIUM SCH MG: 20 TABLET, DELAYED RELEASE ORAL at 11:03

## 2017-11-28 RX ADMIN — Medication SCH TAB: at 11:02

## 2017-11-28 RX ADMIN — FERROUS SULFATE TAB EC 324 MG (65 MG FE EQUIVALENT) SCH MG: 324 (65 FE) TABLET DELAYED RESPONSE at 11:03

## 2017-11-28 RX ADMIN — CEPHALEXIN SCH MG: 500 CAPSULE ORAL at 11:02

## 2017-11-28 RX ADMIN — LEVOTHYROXINE SODIUM SCH MCG: 75 TABLET ORAL at 06:10

## 2017-11-28 RX ADMIN — OXYCODONE HYDROCHLORIDE AND ACETAMINOPHEN SCH MG: 500 TABLET ORAL at 11:03

## 2017-11-28 RX ADMIN — POTASSIUM CHLORIDE SCH MEQ: 1500 TABLET, EXTENDED RELEASE ORAL at 12:04

## 2017-11-28 RX ADMIN — POLYETHYLENE GLYCOL 3350 SCH: 17 POWDER, FOR SOLUTION ORAL at 10:58

## 2017-11-28 RX ADMIN — QUETIAPINE FUMARATE SCH MG: 25 TABLET ORAL at 11:02

## 2017-11-28 NOTE — DS
Physical Examination


Vital Signs: 


 Vital Signs











Temperature  98.9 F   11/28/17 05:41


 


Pulse Rate  105 H  11/28/17 05:41


 


Respiratory Rate  19   11/28/17 05:41


 


Blood Pressure  105/60   11/28/17 05:41


 


O2 Sat by Pulse Oximetry (%)  96   11/27/17 19:54











Labs: 


 CBC, BMP





 11/27/17 06:45 





 11/28/17 07:50 











Discharge Summary


Reason For Visit: URINARY TRACT INFECTION,ANEMIA,FRACTUREOF LEFT HIP


Current Active Problems





Acute kidney failure (Acute)


Anemia (Acute)


Fracture of left hip (Acute)


UTI (urinary tract infection) (Acute)








Hospital Course: 





Ms. Matos admitted with left hip fracture, s/p fall. Refusing surgery. Shw as 

also treated for Acute renal failure with IV fluids and treated for UTI with 

abx. She finished her course. Her CT A&P showed chronic right sided 

hydronephrosis which will require outpatient urology follow up. Possible rectal 

mass will also require outpatient GI follow up/opinion. She is stable to be 

discharged back to NH.





Anemia (Acute)


Fracture of left hip (Acute)


UTI (urinary tract infection) (Acute)


GAVIN, now resolved


hypothyroid





-Refusing surgery


-Replete potassium


-CT abdomen & pelvis reviewed.


-Chronic right sided hydronephrosis - out patient urology follow up.


-Possible rectal mass - out patient GI follow up.


-Patient will be transferred back to [facility].


Condition: Fair





- Instructions


Referrals: 


Tyrone Huerta MD [Primary Care Provider] - 


Disposition: SKILLED NURSING FACILITY





- Home Medications


Comprehensive Discharge Medication List: 


Ambulatory Orders





Aspirin [Tasha Chewable Aspirin] 81 mg PO DAILY 11/01/17 


Lactobacillus Acidophilus [Acidophilus] 1 each PO BID 11/01/17 


Trazodone HCl 25 mg PO HS 11/01/17 


Aa/Hydrolyzed Collagen, Whey [Lps Neutral Flavor Liquid] 30 ml PO DAILY 11/22/ 17 


Ascorbic Acid [Vitamin C -] 500 mg PO TID 11/22/17 


Ferrous Sulfate 325 mg PO TID 11/22/17 


Heparin - 5,000 unit SQ BID 11/22/17 


Levothyroxine [Synthroid -] 150 mcg PO ACBK 11/22/17 


Omeprazole Magnesium [Prilosec] 20 mg PO ACBK 11/22/17 


Potassium Chloride [K-Dur -] 20 meq PO DAILY 11/22/17 


Quetiapine Fumarate [Seroquel -] 37.5 mg PO BID 11/22/17

## 2017-11-28 NOTE — PN
Progress Note (short form)





- Note


Progress Note: 





Patient refusing surgery for hip. 


CT abdomen and pelvis results noted.


No acute event overnight.


Afebrile.








 Current Medications





Amino Acids (Prosource No Carb Liquid Pkt)  30 ml PO DAILY@0800 [facility]


   Last Admin: 11/28/17 07:42 Dose:  30 ml


Ascorbic Acid (Vitamin C -)  500 mg PO DAILY [facility]


   Last Admin: 11/28/17 11:03 Dose:  Not Given


Cephalexin HCl (Keflex -)  500 mg PO BID [facility]


   Last Admin: 11/28/17 11:02 Dose:  500 mg


Docusate Sodium (Colace -)  300 mg PO [facility]


   Last Admin: 11/27/17 21:19 Dose:  300 mg


Ferrous Sulfate (Feosol -)  325 mg PO DAILY [facility]


   Last Admin: 11/28/17 11:03 Dose:  Not Given


Lactobacillus Acidophilus (Bacid -)  1 tab PO BID [facility]


   Last Admin: 11/28/17 11:02 Dose:  1 tab


Levothyroxine Sodium (Synthroid -)  150 mcg PO DAILY@0700 [facility]


   Last Admin: 11/28/17 06:10 Dose:  150 mcg


Pantoprazole Sodium (Protonix -)  20 mg PO DAILY [facility]


   Last Admin: 11/28/17 11:03 Dose:  20 mg


Potassium Chloride (K-Dur -)  40 meq PO Q1H [facility]


   Stop: 11/28/17 12:31


Potassium Chloride (K-Dur -)  20 meq PO DAILY [facility]


Quetiapine Fumarate (Seroquel -)  50 mg PO BID [facility]


   Last Admin: 11/28/17 11:02 Dose:  50 mg


Trazodone HCl (Desyrel -)  25 mg PO [facility]


   Last Admin: 11/27/17 21:18 Dose:  25 mg














- Objective


Vital Signs: 


 


 


 Vital Signs











 Period  Temp  Pulse  Resp  BP Sys/Barriga  Pulse Ox


 


 Last 24 Hr  98.0 F-98.9 F  102-105  17-21  101-105/60-71  96

















Constitutional: Yes: No Distress, Calm


Neck: Yes: Supple, Trachea Midline


Cardiovascular: Yes: Regular Rate and Rhythm, S1, S2.  No: Murmur


Respiratory: Yes: Regular, CTA Bilaterally.  No: Rales, Rhonchi, Wheezes


Gastrointestinal: Yes: Normal Bowel Sounds, Soft.  No: Distention, Tenderness


Edema: No


Neurological: Yes: Alert, Oriented


Labs: 


 


 


 CBC, BMP





 11/27/17 06:45 





 11/28/17 07:50 

















Assessment/Plan





Current Active Problems





Anemia (Acute)


Fracture of left hip (Acute)


UTI (urinary tract infection) (Acute)


GAVIN, now resolved


hypothyroid





-Refusing surgery


-Replete potassium


-CT abdomen & pelvis reviewed.


-Chronic right sided hydronephrosis - out patient urology follow up.


-Possible rectal mass - out patient GI follow up.


-Patient will be transferred back to [facility].

## 2018-01-12 ENCOUNTER — HOSPITAL ENCOUNTER (INPATIENT)
Dept: HOSPITAL 74 - JER | Age: 73
LOS: 12 days | Discharge: NURSING, REHAB OR LONG-TERM CARE | DRG: 371 | End: 2018-01-24
Attending: INTERNAL MEDICINE | Admitting: INTERNAL MEDICINE
Payer: COMMERCIAL

## 2018-01-12 VITALS — BODY MASS INDEX: 14.6 KG/M2

## 2018-01-12 DIAGNOSIS — E83.39: ICD-10-CM

## 2018-01-12 DIAGNOSIS — I95.9: ICD-10-CM

## 2018-01-12 DIAGNOSIS — N17.9: ICD-10-CM

## 2018-01-12 DIAGNOSIS — E87.6: ICD-10-CM

## 2018-01-12 DIAGNOSIS — D64.9: ICD-10-CM

## 2018-01-12 DIAGNOSIS — E03.9: ICD-10-CM

## 2018-01-12 DIAGNOSIS — K21.9: ICD-10-CM

## 2018-01-12 DIAGNOSIS — B96.89: ICD-10-CM

## 2018-01-12 DIAGNOSIS — Z91.81: ICD-10-CM

## 2018-01-12 DIAGNOSIS — E87.1: ICD-10-CM

## 2018-01-12 DIAGNOSIS — N13.30: ICD-10-CM

## 2018-01-12 DIAGNOSIS — E86.0: ICD-10-CM

## 2018-01-12 DIAGNOSIS — F03.90: ICD-10-CM

## 2018-01-12 DIAGNOSIS — A04.72: Primary | ICD-10-CM

## 2018-01-12 DIAGNOSIS — M25.552: ICD-10-CM

## 2018-01-12 DIAGNOSIS — G93.41: ICD-10-CM

## 2018-01-12 DIAGNOSIS — G89.29: ICD-10-CM

## 2018-01-12 DIAGNOSIS — E87.2: ICD-10-CM

## 2018-01-12 DIAGNOSIS — I25.10: ICD-10-CM

## 2018-01-12 LAB
ALBUMIN SERPL-MCNC: 4 G/DL (ref 3.4–5)
ALP SERPL-CCNC: 188 U/L (ref 45–117)
ALT SERPL-CCNC: 19 U/L (ref 12–78)
ANION GAP SERPL CALC-SCNC: 19 MMOL/L (ref 8–16)
ANION GAP SERPL CALC-SCNC: 21 MMOL/L (ref 8–16)
ANION GAP SERPL CALC-SCNC: 27 MMOL/L (ref 8–16)
APPEARANCE UR: (no result)
APTT BLD: 26.7 SECONDS (ref 26.9–34.4)
AST SERPL-CCNC: 14 U/L (ref 15–37)
BASOPHILS # BLD: 0.3 % (ref 0–2)
BILIRUB SERPL-MCNC: 0.4 MG/DL (ref 0.2–1)
BILIRUB UR STRIP.AUTO-MCNC: NEGATIVE MG/DL
BNP SERPL-MCNC: 644.57 PG/ML (ref 5–125)
BUN SERPL-MCNC: 158 MG/DL (ref 7–18)
BUN SERPL-MCNC: 164 MG/DL (ref 7–18)
BUN SERPL-MCNC: 85 MG/DL (ref 7–18)
CALCIUM SERPL-MCNC: 7.1 MG/DL (ref 8.5–10.1)
CALCIUM SERPL-MCNC: 7.1 MG/DL (ref 8.5–10.1)
CALCIUM SERPL-MCNC: 8.1 MG/DL (ref 8.5–10.1)
CAOX CRY URNS QL MICRO: (no result) /HPF
CHLORIDE 24H UR-SCNC: < 10 MEQ/L
CHLORIDE SERPL-SCNC: 66 MMOL/L (ref 98–107)
CHLORIDE SERPL-SCNC: 80 MMOL/L (ref 98–107)
CHLORIDE SERPL-SCNC: 81 MMOL/L (ref 98–107)
CHOLEST SERPL-MCNC: 232 MG/DL (ref 50–200)
CO2 SERPL-SCNC: 15 MMOL/L (ref 21–32)
CO2 SERPL-SCNC: 16 MMOL/L (ref 21–32)
CO2 SERPL-SCNC: 18 MMOL/L (ref 21–32)
COLOR UR: YELLOW
CREAT SERPL-MCNC: 2.6 MG/DL (ref 0.55–1.02)
CREAT SERPL-MCNC: 2.8 MG/DL (ref 0.55–1.02)
CREAT SERPL-MCNC: 3.4 MG/DL (ref 0.55–1.02)
CREAT UR-MCNC: 133 MG/DL (ref 20–320)
DEPRECATED RDW RBC AUTO: 16.7 % (ref 11.6–15.6)
EOSINOPHIL # BLD: 0.1 % (ref 0–4.5)
EPITH CASTS URNS QL MICRO: (no result) /HPF
GLUCOSE SERPL-MCNC: 151 MG/DL (ref 74–106)
GLUCOSE SERPL-MCNC: 166 MG/DL (ref 74–106)
GLUCOSE SERPL-MCNC: 188 MG/DL (ref 74–106)
HCT VFR BLD CALC: 41 % (ref 32.4–45.2)
HDLC SERPL-MCNC: 38 MG/DL (ref 40–60)
HGB BLD-MCNC: 13.4 GM/DL (ref 10.7–15.3)
HYALINE CASTS URNS QL MICRO: 3 /LPF
INR BLD: 1 (ref 0.82–1.09)
KETONES UR QL STRIP: NEGATIVE
LDLC SERPL CALC-MCNC: 170 MG/DL (ref 5–100)
LEUKOCYTE ESTERASE UR QL STRIP.AUTO: (no result)
LYMPHOCYTES # BLD: 7.6 % (ref 8–40)
MAGNESIUM SERPL-MCNC: 4.1 MG/DL (ref 1.8–2.4)
MCH RBC QN AUTO: 25.2 PG (ref 25.7–33.7)
MCHC RBC AUTO-ENTMCNC: 32.8 G/DL (ref 32–36)
MCV RBC: 76.8 FL (ref 80–96)
MONOCYTES # BLD AUTO: 3.9 % (ref 3.8–10.2)
MUCOUS THREADS URNS QL MICRO: (no result)
NEUTROPHILS # BLD: 88.1 % (ref 42.8–82.8)
NITRITE UR QL STRIP: NEGATIVE
OSMOLALITY SERPL: 298 MOSM/KG (ref 278–305)
OSMOLALITY SERPL: 304 MOSM/KG (ref 278–305)
OSMOLALITY,URINE: 376 MOSM/KG (ref 300–900)
PH UR: 5 [PH] (ref 5–8)
PLATELET # BLD AUTO: 493 K/MM3 (ref 134–434)
PMV BLD: 9.3 FL (ref 7.5–11.1)
POTASSIUM SERPLBLD-SCNC: 2.8 MMOL/L (ref 3.5–5.1)
POTASSIUM SERPLBLD-SCNC: 2.9 MMOL/L (ref 3.5–5.1)
POTASSIUM SERPLBLD-SCNC: 3.1 MMOL/L (ref 3.5–5.1)
PROT SERPL-MCNC: 9.2 G/DL (ref 6.4–8.2)
PROT UR QL STRIP: (no result)
PROT UR QL STRIP: NEGATIVE
PT PNL PPP: 11.3 SEC (ref 9.98–11.88)
RBC # BLD AUTO: 5.34 M/MM3 (ref 3.6–5.2)
RBC # UR STRIP: (no result) /UL
SODIUM SERPL-SCNC: 109 MMOL/L (ref 136–145)
SODIUM SERPL-SCNC: 117 MMOL/L (ref 136–145)
SODIUM SERPL-SCNC: 117 MMOL/L (ref 136–145)
SP GR UR: 1.01 (ref 1–1.03)
TRIGL SERPL-MCNC: 155 MG/DL (ref 35–160)
UROBILINOGEN UR STRIP-MCNC: NEGATIVE MG/DL (ref 0.2–1)
WBC # BLD AUTO: 12.5 K/MM3 (ref 4–10)
YEAST #/AREA URNS HPF: (no result) /[HPF]

## 2018-01-12 RX ADMIN — POTASSIUM CHLORIDE SCH MLS/HR: 7.46 INJECTION, SOLUTION INTRAVENOUS at 12:38

## 2018-01-12 RX ADMIN — POTASSIUM CHLORIDE SCH MLS/HR: 7.46 INJECTION, SOLUTION INTRAVENOUS at 13:45

## 2018-01-12 RX ADMIN — TAZOBACTAM SODIUM AND PIPERACILLIN SODIUM SCH MLS/HR: 250; 2 INJECTION, SOLUTION INTRAVENOUS at 20:31

## 2018-01-12 RX ADMIN — CHLORHEXIDINE GLUCONATE SCH APPLIC: 213 SOLUTION TOPICAL at 21:50

## 2018-01-12 RX ADMIN — HEPARIN SODIUM SCH UNIT: 5000 INJECTION, SOLUTION INTRAVENOUS; SUBCUTANEOUS at 21:50

## 2018-01-12 RX ADMIN — MUPIROCIN SCH APPLIC: 20 OINTMENT TOPICAL at 21:50

## 2018-01-12 RX ADMIN — POTASSIUM CHLORIDE SCH MLS/HR: 7.46 INJECTION, SOLUTION INTRAVENOUS at 14:37

## 2018-01-12 NOTE — CON.NEP
Consult


Consult Specialty:: Nephrology


Referred by:: Dr. Wyatt


Reason for Consultation:: SKIP, Hyponatremia





- History of Present Illness


Chief Complaint: AMS


History of Present Illness: 





This is a 72 year old woman with PMhx of CKD (Hx of SKIP, Hydronephrotic right 

Kidney), Dementia, Hypertension who presented from NH with syncope and AMS as 

inpatient and found to have hyponatremia with serum Na of 109 and SKIP. Pt is 

not able to provide any history. ED staff reports that pt was awake initially 

and was able to answer some questions but MS deteriorated in the ED. Central 

line placed in the ED. 





- History Source


History Provided By: Medical Record


Limitations to Obtaining History: Clinical Condition





- Past Medical History


CNS: Yes: Dementia


Renal/: Yes: UTI, Other (Chronic Right hydronephrosis)


Endocrine: Yes: Hypothyroidism





- Alcohol/Substance Use


Hx Alcohol Use: No





- Smoking History


Smoking history: Unknown if ever smoked


Have you smoked in the past 12 months: No





- Social History


Usual Living Arrangement: Nursing Home





Home Medications





- Allergies


Allergies/Adverse Reactions: 


 Allergies











Allergy/AdvReac Type Severity Reaction Status Date / Time


 


No Known Allergies Allergy   Verified 01/12/18 10:15














- Home Medications


Home Medications: 


Ambulatory Orders





Aa/Hydrolyzed Collagen, Whey [Lps Neutral Flavor Liquid] 30 ml PO BID 01/12/18 


Acetaminophen [Tylenol] 650 mg PO Q6H PRN 01/12/18 


Ascorbic Acid [Vitamin C -] 500 mg PO TID 01/12/18 


Aspirin [Aspirin EC] 81 mg PO DAILY 01/12/18 


Bacitracin - [Bacitracin Topical Ointment -] 1 applic TP DAILY 01/12/18 


Docusate Sodium [Colace -] 300 mg PO HS 01/12/18 


Ferrous Sulfate [Feosol] 325 mg PO TID 01/12/18 


Heparin - 5,000 unit SQ BID 01/12/18 


Lactobacillus Acidophilus [Acidophilus] 1 each PO BID 01/12/18 


Levothyroxine Sodium [Levoxyl] 150 mcg PO DAILY 01/12/18 


Omeprazole Magnesium [Prilosec Otc] 20 mg PO DAILY 01/12/18 


Oseltamivir Phosphate [Tamiflu] 75 mg PO DAILY 01/12/18 


Potassium Chloride [K-Dur -] 40 meq PO DAILY 01/12/18 


Quetiapine Fumarate [Seroquel -] 50 mg PO BID 01/12/18 


Sennosides [Senna] 2 tab PO DAILY 01/12/18 


Trazodone HCl 25 mg PO HS 01/12/18 











Family Disease History





- Family Disease History


Family History: Unable to Obtain





Review of Systems


Unable to obtain ROS, reason: because of clinical statu





Nephrology Consult





- Height


Height: 5 ft





- Weight


Weight: 40.37 kg





- BMI


Body Mass Index (BMI): 17.4





- Lab Results


CBC,BMP: 


 CBC, BMP





 01/12/18 10:20 





 01/12/18 10:20 








Anion Gap: 


 Anion Gap











Anion Gap  27  (8-16)  H  01/12/18  10:20    














- Imaging


Chest X-ray: Report Reviewed





- Physical Examination


Vital Signs: 


 Vital Signs











Temperature  98.4 F   01/12/18 10:31


 


Pulse Rate  89   01/12/18 15:39


 


Respiratory Rate  16   01/12/18 15:39


 


Blood Pressure  129/77   01/12/18 15:39


 


O2 Sat by Pulse Oximetry (%)  97   01/12/18 15:39











Constitutional: Yes: Thin, Other (Dry MM)


Eyes: Yes: Conjunctiva Clear


HENT: Yes: Atraumatic


Neck: Yes: Supple


Cardiovascular: Yes: Regular Rate and Rhythm, S1, S2.  No: Bruit, JVD


Respiratory: Yes: Regular, CTA Bilaterally


Gastrointestinal: Yes: Normal Bowel Sounds, Soft, Tenderness, Rebound


Renal/: Yes: Ash Present.  No: Bladder Distention, CVA Tenderness - Left, 

CVA Tenderness - Right


Edema: No


Neurological: No: Alert, Oriented





Assessment/Plan





72 year old woman with PMhx of CKD (Hx of SKIP, Hydronephrotic right Kidney), 

Dementia, Hypertension who presented from NH with syncope and AMS as inpatient 

and found to have hyponatremia with serum Na of 109 and SKIP.





#Acute Symptomatic Hypovolemic Hyponatremia


Serum Osm recorded as 298, 2 hours after first BMP drawn


will check repeat BMP and OSM now


started on 3% saline for symptomatic hyponatremia, will monitor Na closely


expect very rapid improvement given pt is hypovolemic so will give DDAVP 1mcg 

IV x 1 to prevent too rapid of a correction 


monitor MS


will need isotonic saline once MS is improved or Na improves by more then 8





#Acute Renal Failure


suspect from hypovolemia 


Urine Na is low indicating preserved tubular function 





#AMS/Syncope


likely due to electrolyte derangements and volume loss





#Leukocytosis/Sepsis


f/u cultures


Abx as per ID

## 2018-01-12 NOTE — CONSULT
Consult


Consult Specialty:: PULM / CCM


Referred by:: Dr. Akira Wyatt


Reason for Consultation:: Metabolic Disarray





- History of Present Illness


Chief Complaint: AMS


History of Present Illness: 


Ms. Locke is a 73 y/o woman w/ SREEKANTH, anemia, CAD, GERD, hypokalemia, & c-diff

, who [facility] the 

setting of copious diarrhea. HPI is limited due to patients dementia. In the ED 

pt remark for hyponatremia w/ a serum Na+ of 109 and SKIP. Pt admitted to the 

ICU for metabolic disarray.





- History Source


History Provided By: Medical Record, Transfer Record


Limitations to Obtaining History: Dementia





- Past Medical History


CNS: Yes: Dementia


Cardio/Vascular: Yes: CAD


Gastrointestinal: Yes: GERD


Renal/: Yes: UTI, Other (Chronic Right hydronephrosis)


Heme/Onc: Yes: Anemia


Endocrine: Yes: Hypothyroidism





- Alcohol/Substance Use


Hx Alcohol Use: No





- Smoking History


Smoking history: Unknown if ever smoked


Have you smoked in the past 12 months: No





- Social History


Usual Living Arrangement: Nursing Home





Home Medications





- Allergies


Allergies/Adverse Reactions: 


 Allergies











Allergy/AdvReac Type Severity Reaction Status Date / Time


 


No Known Allergies Allergy   Verified 01/12/18 10:15














- Home Medications


Home Medications: 


Ambulatory Orders





Aa/Hydrolyzed Collagen, Whey [Lps Neutral Flavor Liquid] 30 ml PO BID 01/12/18 


Acetaminophen [Tylenol] 650 mg PO Q6H PRN 01/12/18 


Ascorbic Acid [Vitamin C -] 500 mg PO TID 01/12/18 


Aspirin [Aspirin EC] 81 mg PO DAILY 01/12/18 


Bacitracin - [Bacitracin Topical Ointment -] 1 applic TP DAILY 01/12/18 


Docusate Sodium [Colace -] 300 mg PO HS 01/12/18 


Ferrous Sulfate [Feosol] 325 mg PO TID 01/12/18 


Heparin - 5,000 unit SQ BID 01/12/18 


Lactobacillus Acidophilus [Acidophilus] 1 each PO BID 01/12/18 


Levothyroxine Sodium [Levoxyl] 150 mcg PO DAILY 01/12/18 


Omeprazole Magnesium [Prilosec Otc] 20 mg PO DAILY 01/12/18 


Oseltamivir Phosphate [Tamiflu] 75 mg PO DAILY 01/12/18 


Potassium Chloride [K-Dur -] 40 meq PO DAILY 01/12/18 


Quetiapine Fumarate [Seroquel -] 50 mg PO BID 01/12/18 


Sennosides [Senna] 2 tab PO DAILY 01/12/18 


Trazodone HCl 25 mg PO HS 01/12/18 











Family Disease History





- Family Disease History


Family History: Unable to Obtain (SREEKANTH)





Review of Systems


Unable to obtain ROS, reason: SREEKANTH





Physical Exam


Vital Signs: 


 Vital Signs











Temperature  97.0 F L  01/12/18 18:00


 


Pulse Rate  88   01/12/18 20:00


 


Respiratory Rate  16   01/12/18 21:00


 


Blood Pressure  105/57   01/12/18 20:00


 


O2 Sat by Pulse Oximetry (%)  97   01/12/18 16:42











Constitutional: Yes: Well Nourished, No Distress, Cachectic


Eyes: Yes: WNL, Conjunctiva Clear, EOM Intact


HENT: Yes: WNL, Atraumatic


Neck: Yes: WNL, Supple, Trachea Midline


Cardiovascular: Yes: WNL, Regular Rate and Rhythm


Respiratory: Yes: WNL, Regular, CTA Bilaterally


Gastrointestinal: Yes: WNL, Soft, Hyperactive Bowel Sounds


...Rectal Exam: Yes: Deferred


Renal/: Yes: WNL


Breast(s): Yes: WNL


Musculoskeletal: Yes: WNL


Extremities: Yes: WNL


Edema: No


Integumentary: Yes: Other (DRY)


Neurological: Yes: Weakness


...Motor Strength: WNL


Psychiatric: Yes: WNL


Labs: 


 CBC, BMP





 01/12/18 10:20 





 01/12/18 20:20 











Imaging





- Results


Chest X-ray: Image Reviewed (12/12:  1. Interval placement of right internal 

jugular approach central venous catheter with the tip in  appropriate position, 

terminating over the cavoatrial junction.   2. No definable pneumothorax. No 

evidence of airspace consolidation or pleural effusion.   3. Ill-defined 

nodular opacities projecting over the right midlung and left upper lobe as 

above.  Please correlate with nonemergent, noncontrast chest CT.   4. 

Comminuted fracture of the right humeral head and neck, as seen on 11/01/2017 

right shoulder x- ray.)





Problem List





- Problems


(1) Diarrhea


Code(s): R19.7 - DIARRHEA, UNSPECIFIED   





(2) Hyponatremia


Code(s): E87.1 - HYPO-OSMOLALITY AND HYPONATREMIA   





(3) Dementia


Code(s): F03.90 - UNSPECIFIED DEMENTIA WITHOUT BEHAVIORAL DISTURBANCE   





(4) Acute kidney failure


Code(s): N17.9 - ACUTE KIDNEY FAILURE, UNSPECIFIED   





Assessment/Plan





ASSESS: This is a 73 y/o woman w/ SREEKANTH, HTN, anemia, CAD, GERD, CKD, & c-diff,  

who presented from NH with syncope and AMS as inpatient and found to have 

hyponatremia w/ a serum Na of 109 and SKIP. 





-Supp FiO2 prn


-Nebs


-CPT


-Pan Clxr


-Abx


-Determine cause of Diarrhea --> send C-Diff


-NS


-Do NOT correct Na+ faster than 0.5mEq/Hr


-Nothypertonic Saline


-Consider DDAVP


-Strict I's & O's


-Trend BUN/Cr


-Replete e-lytes prn


-RENAL





DGL, ACNP-BC


[facility] ICU


[id number]


PULM/CCM





Critical Care Time/MDM Note


Total Critical Care Time: 40


Critical Care Statement: The care of this patient involved high complexity 

decision making to prevent further life threatening deterioration of the patient

's condition and/or to evaluate & treat vital organ system(s) failure or risk 

of failure.

## 2018-01-12 NOTE — HP
Admitting History and Physical





- Primary Care Physician


PCP: Tyrone Huerta





- Admission


Chief Complaint: unable to obtain


History of Present Illness: 


72 year old female with history of Dementia, frequent falls, hypothyroidism, 

Anemia and Left hip fracture(refused surgery) brought in from [facility] s/p fall/syncope and diarrhea.  Unable to obtain further history due to pt

's mental status. 


History Source: Medical Record


Limitations to Obtaining History: Clinical Condition, Dementia





- Past Medical History


CNS: Yes: Dementia


Renal/: Yes: UTI, Other (Chronic Right hydronephrosis)


Heme/Onc: Yes: Anemia


Endocrine: Yes: Hypothyroidism





- Smoking History


Smoking history: Unknown if ever smoked


Have you smoked in the past 12 months: No





- Alcohol/Substance Use


Hx Alcohol Use: No





- Social History


Usual Living Arrangement: Yes: Nursing Home





Home Medications





- Allergies


Allergies/Adverse Reactions: 


 Allergies











Allergy/AdvReac Type Severity Reaction Status Date / Time


 


No Known Allergies Allergy   Verified 01/12/18 10:15














- Home Medications


Home Medications: 


Ambulatory Orders





Aa/Hydrolyzed Collagen, Whey [Lps Neutral Flavor Liquid] 30 ml PO BID 01/12/18 


Acetaminophen [Tylenol] 650 mg PO Q6H PRN 01/12/18 


Ascorbic Acid [Vitamin C -] 500 mg PO TID 01/12/18 


Aspirin [Aspirin EC] 81 mg PO DAILY 01/12/18 


Bacitracin - [Bacitracin Topical Ointment -] 1 applic TP DAILY 01/12/18 


Docusate Sodium [Colace -] 300 mg PO HS 01/12/18 


Ferrous Sulfate [Feosol] 325 mg PO TID 01/12/18 


Heparin - 5,000 unit SQ BID 01/12/18 


Lactobacillus Acidophilus [Acidophilus] 1 each PO BID 01/12/18 


Levothyroxine Sodium [Levoxyl] 150 mcg PO DAILY 01/12/18 


Omeprazole Magnesium [Prilosec Otc] 20 mg PO DAILY 01/12/18 


Oseltamivir Phosphate [Tamiflu] 75 mg PO DAILY 01/12/18 


Potassium Chloride [K-Dur -] 40 meq PO DAILY 01/12/18 


Quetiapine Fumarate [Seroquel -] 50 mg PO BID 01/12/18 


Sennosides [Senna] 2 tab PO DAILY 01/12/18 


Trazodone HCl 25 mg PO HS 01/12/18 











Family Disease History





- Family Disease History


Family History: Unable to Obtain





Physical Examination


Vital Signs: 


 Vital Signs











Temperature  98.4 F   01/12/18 10:31


 


Pulse Rate  88   01/12/18 13:15


 


Respiratory Rate  16   01/12/18 13:15


 


Blood Pressure  110/71   01/12/18 13:15


 


O2 Sat by Pulse Oximetry (%)  97   01/12/18 13:15











Constitutional: Yes: Calm, Thin


Cardiovascular: Yes: WNL, Regular Rate and Rhythm


Respiratory: Yes: WNL, CTA Bilaterally, Diminished, On Nasal O2


Gastrointestinal: Yes: Normal Bowel Sounds, Soft.  No: Distention


Edema: No


Integumentary: Yes: WNL


Labs: 


 CBC, BMP





 01/12/18 10:20 





 01/12/18 10:20 











Imaging





- Results


Chest X-ray: Report Reviewed


X-ray: Report Reviewed


Cat Scan: Report Reviewed





Problem List





- Problems


(1) Acute hyponatremia


Assessment/Plan: 


Pt found to have severe hypovolemic hyponatremia with altered mental status 


Nephrology consulted


Urine Na, Osm pending 


1L NS bolus given


NS 3% 24mL/hr, DDAVP 


Monitor Na level


Transfer to ICU for further management 


Code(s): E87.1 - HYPO-OSMOLALITY AND HYPONATREMIA   





(2) Acute metabolic encephalopathy


Assessment/Plan: 


Secondary to severe hyponatremia 


Treat hyponatremia per Nephrology recommendations 


ID consulted 


Code(s): G93.41 - METABOLIC ENCEPHALOPATHY   





(3) Acute hypokalemia


Assessment/Plan: 


Secondary to diarrhea, fluid loss


Replete as needed 


Code(s): E87.6 - HYPOKALEMIA   





(4) Acute kidney failure


Assessment/Plan: 


Most likely pre renal 


IVF hydration


Urine Na, Osm pending


Consider renal US, will discuss with Nephrology


Code(s): N17.9 - ACUTE KIDNEY FAILURE, UNSPECIFIED   





(5) Diarrhea


Assessment/Plan: 


cause of diarrhea unknown


c-diff test pending


ID consulted 


Code(s): R19.7 - DIARRHEA, UNSPECIFIED   





(6) Hypotension


Assessment/Plan: 


Secondary to fluid loss/hypovolemia


IVF hydration 


Code(s): I95.9 - HYPOTENSION, UNSPECIFIED   





(7) Fall


Assessment/Plan: 


s/p fall at SNF, xrays, head CT negative, chronic fracture of left hip(

unchanged from previous CT)


maintain bedrest at the moment  


Code(s): W19.XXXA - UNSPECIFIED FALL, INITIAL ENCOUNTER   





(8) Hypothyroidism


Assessment/Plan: 


Holding Levothyroxine for now  


Code(s): E03.9 - HYPOTHYROIDISM, UNSPECIFIED   





(9) Anemia


Assessment/Plan: 


hg/hct stable


will continue to monitor


Code(s): D64.9 - ANEMIA, UNSPECIFIED   


Qualifiers: 


   Anemia type: unspecified type   Qualified Code(s): D64.9 - Anemia, 

unspecified   





(10) Fracture of left hip


Assessment/Plan: 


unchanged from previous CT in November 


Pt had refused surgery 


Code(s): S72.002A - FRACTURE OF UNSP PART OF NECK OF LEFT FEMUR, INIT   


Qualifiers: 


   Encounter type: subsequent encounter   Fracture type: closed

## 2018-01-12 NOTE — PDOC
History of Present Illness





- General


History Source: Nursing Home Records, Old Records


Exam Limitations: Dementia





- History of Present Illness


Initial Comments: 


01/12/18 10:54


The patient is a 72-year-old female, with a significant past medical history of 

anemia, CAD, GERD, hypokalemia, early dementia, c-diff, who [facility]. The patient was found 

unconscious in the bathroom this morning. She was noted to have low O2 

saturation and was placed on O2. The patient has no memory of the incident. On 

exam, she reports that she is experiencing diarrhea. HPI is limited due to 

patients dementia. 





PCP: Dr. Huerta


Surgical Hx: Right Hip Surgery








<Ninfa Sharma - Last Filed: 01/12/18 17:18>





<Jaime Lemus - Last Filed: 01/12/18 18:40>





- General


Chief Complaint: Shortness of Breath


Stated Complaint: RESPIRATORY DISTRESS


Time Seen by Provider: 01/12/18 10:15





Past History





<Ninfa Sharma - Last Filed: 01/12/18 17:18>





- Past Medical History


Anemia: Yes


COPD: No


GI Disorders: Yes (gerd)


Psychiatric Problems: Yes (restlessness and agitation, psychosis.)


Thyroid Disease: Yes (hypo)





- Surgical History


Abdominal Surgery: No


Appendectomy: No


Cardiac Surgery: No


Cholecystectomy: No


Lung Surgery: No


Neurologic Surgery: No


Orthopedic Surgery:  (YES; RIGHT HIP SURGERY)





- Suicide/Smoking/Psychosocial Hx


Smoking History: Unknown if ever smoked


Have you smoked in the past 12 months: No


Information on smoking cessation initiated: No


Hx Alcohol Use: No


Drug/Substance Use Hx: No


Substance Use Type: None


Hx Substance Use Treatment: No





<Jaime Lemus - Last Filed: 01/12/18 18:40>





- Past Medical History


Allergies/Adverse Reactions: 


 Allergies











Allergy/AdvReac Type Severity Reaction Status Date / Time


 


No Known Allergies Allergy   Verified 01/12/18 10:15











Home Medications: 


Ambulatory Orders





Aa/Hydrolyzed Collagen, Whey [Lps Neutral Flavor Liquid] 30 ml PO BID 01/12/18 


Acetaminophen [Tylenol] 650 mg PO Q6H PRN 01/12/18 


Ascorbic Acid [Vitamin C -] 500 mg PO TID 01/12/18 


Aspirin [Aspirin EC] 81 mg PO DAILY 01/12/18 


Bacitracin - [Bacitracin Topical Ointment -] 1 applic TP DAILY 01/12/18 


Docusate Sodium [Colace -] 300 mg PO HS 01/12/18 


Ferrous Sulfate [Feosol] 325 mg PO TID 01/12/18 


Heparin - 5,000 unit SQ BID 01/12/18 


Lactobacillus Acidophilus [Acidophilus] 1 each PO BID 01/12/18 


Levothyroxine Sodium [Levoxyl] 150 mcg PO DAILY 01/12/18 


Omeprazole Magnesium [Prilosec Otc] 20 mg PO DAILY 01/12/18 


Oseltamivir Phosphate [Tamiflu] 75 mg PO DAILY 01/12/18 


Potassium Chloride [K-Dur -] 40 meq PO DAILY 01/12/18 


Quetiapine Fumarate [Seroquel -] 50 mg PO BID 01/12/18 


Sennosides [Senna] 2 tab PO DAILY 01/12/18 


Trazodone HCl 25 mg PO HS 01/12/18 











**Review of Systems





- Review of Systems


Comments:: 


01/12/18 10:55


ROS unable to obtain due to the patient's dementia. 





<Ninfa Sharma - Last Filed: 01/12/18 17:18>





*Physical Exam





- Vital Signs


 Last Vital Signs











Temp Pulse Resp BP Pulse Ox


 


 98.4 F   89   18   102/70   99 


 


 01/12/18 10:31  01/12/18 10:06  01/12/18 10:06  01/12/18 10:06  01/12/18 10:06














- Physical Exam


Comments: 


01/12/18 10:57


Vitals: Triage Vital signs reviewed


General Appearance:  no acute distress.


Head: Atraumatic, normocephalic


Throat: +Dry mucous membranes. Posterior oropharynx without erythema.


Neck:  Supple;No Nuchal rigidity


Chest Wall: Nontender


Cardiac: Regular rate and rhythm, no murmurs, no rubs, no gallops,


Lungs: Clear to auscultation bilateral, good air movement bilaterally,


Abdomen:  Soft, nondistended, normal bowel sounds, nontender to palpation


Rectal: Exam deferred


Extremities: Full range of motion to all extremities, no clubbing, or edema


Skin: +Bruising noted on right leg. Warm and dry.


Neuro: Cranial Nerves 2-12 grossly intact, Strength intact to all extremities, 

Sensation intact to all extremities


Psych:  normal mood, normal affect








<Ninfa Sharma - Last Filed: 01/12/18 17:18>





- Vital Signs


 Last Vital Signs











Temp Pulse Resp BP Pulse Ox


 


 97.9 F   89   18   102/70   99 


 


 01/12/18 10:06  01/12/18 10:06  01/12/18 10:06  01/12/18 10:06  01/12/18 10:06














<Jaime Lemus - Last Filed: 01/12/18 18:40>





**Heart Score/ECG Review





- ECG Intrepretation


Comment:: 


01/12/18 17:18


EKG performed at 10:04 demonstrates rate of 87 bpm, rhythm of normal sinus 

rhythm, axis equal to normal. 


Additional findings include: Possible left atrial enlargement. Junctional ST 

depression, probably normal.





<Ninfa Sharma - Last Filed: 01/12/18 17:18>





ED Treatment Course





- LABORATORY


CBC & Chemistry Diagram: 


 01/12/18 10:20





 01/12/18 10:20





<Ninfa Sharma - Last Filed: 01/12/18 17:18>





- LABORATORY


CBC & Chemistry Diagram: 


 01/12/18 10:20





 01/12/18 16:50





<Allan,Jaime - Last Filed: 01/12/18 18:40>





Medical Decision Making





- Medical Decision Making


01/12/18 12:00


Reevaluation: Received call from lab stating that patient's blood results were 

abnormal. 





01/12/18 12:15


Dr. Browne was paged and notified via phone service. Second page for Dr. Browne 

was placed at 12:41. Call returned at 12:44, Dr. Rocha will be called 

instead.





01/12/18 12:21


Dr. Huerta was contacted via phone service, message was left.





01/12/18 12:23


Dr. Wyatt was paged overhead. Second overhead page was placed at 12:55. 

Microblog was sent at 13:00. Call was returned at 1:20, Dr. Wyatt will accept 

the patient for ICU.





01/12/18 12:30


Dr. Byrnes was called on cell and message was left. Called returned at 12:46, 

Dr. Collins saw patient in ER. The patient will be consulted by Dr. Collins.





01/12/18 12:51


Dr. Rocha was paged and notified via phone service. Second page placed at 

13:10, call returned at 13:13. 








<Ninfa Sharma - Last Filed: 01/12/18 17:18>





- Critical Care Time


Total Critical Care Time (minutes): 65


Critical Care Statement: The care of this patient involved high complexity 

decision making to prevent further life threatening deterioration of the patient

's condition and/or to evaluate & treat vital organ system(s) failure or risk 

of failure.





- Medical Decision Making








Laboratory analysis concerning for severe hyponatremia and hypokalemia and 

slightly elevated white blood cell count





Central line placed patient verbally consented was unable to sign secondary to 

lethargy and altered mental status see procedure note





Hypertonic saline ordered. IV KCL ordered





Nephrology has been consult it ICU has been consulted, and infectious diseases 

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 

blood cell count





Patient started on Zosyn





Patient admitted to ICU by Dr. Sarika Rocha to consult














<Jaime Lemus - Last Filed: 01/12/18 18:40>





*DC/Admit/Observation/Transfer





- Attestations


Scribe Attestion: 


01/12/18 11:05





Documentation prepared by Ninfa Sharma, acting as medical scribe for Jaime Lemus MD.





<Ninfa Sharma - Last Filed: 01/12/18 17:18>





- Discharge Dispostion


Admit: Yes





<Jaiem Lemus - Last Filed: 01/12/18 18:40>


Diagnosis at time of Disposition: 


 Hyponatremia








- Discharge Dispostion


Condition at time of disposition: Critical

## 2018-01-12 NOTE — CON.ID
Consult


Consult Specialty:: infectious diseases


Reason for Consultation:: amd ,septicemia,uti,hyponatremia





- History of Present Illness


History of Present Illness: 





72 year old female with history of Dementia, frequent falls, hypothyroidism, 

Anemia and Left hip fracture(refused surgery) brought in from [facility] s/p fall/syncope and diarrhea.  Unable to obtain further history due to pt

's mental status.


patients complete history taken from the chart


patient being transferred to icu 


patient also has severe electrolyte abn





- History Source


History Provided By: Medical Record


Limitations to Obtaining History: Clinical Condition





- Past Medical History


CNS: Yes: Dementia





- Alcohol/Substance Use


Hx Alcohol Use: No





- Smoking History


Smoking history: Unknown if ever smoked


Have you smoked in the past 12 months: No





- Social History


Usual Living Arrangement: Nursing Home





Home Medications





- Allergies


Allergies/Adverse Reactions: 


 Allergies











Allergy/AdvReac Type Severity Reaction Status Date / Time


 


No Known Allergies Allergy   Verified 01/12/18 10:15














- Home Medications


Home Medications: 


Ambulatory Orders





Aa/Hydrolyzed Collagen, Whey [Lps Neutral Flavor Liquid] 30 ml PO BID 01/12/18 


Acetaminophen [Tylenol] 650 mg PO Q6H PRN 01/12/18 


Ascorbic Acid [Vitamin C -] 500 mg PO TID 01/12/18 


Aspirin [Aspirin EC] 81 mg PO DAILY 01/12/18 


Bacitracin - [Bacitracin Topical Ointment -] 1 applic TP DAILY 01/12/18 


Docusate Sodium [Colace -] 300 mg PO HS 01/12/18 


Ferrous Sulfate [Feosol] 325 mg PO TID 01/12/18 


Heparin - 5,000 unit SQ BID 01/12/18 


Lactobacillus Acidophilus [Acidophilus] 1 each PO BID 01/12/18 


Levothyroxine Sodium [Levoxyl] 150 mcg PO DAILY 01/12/18 


Omeprazole Magnesium [Prilosec Otc] 20 mg PO DAILY 01/12/18 


Oseltamivir Phosphate [Tamiflu] 75 mg PO DAILY 01/12/18 


Potassium Chloride [K-Dur -] 40 meq PO DAILY 01/12/18 


Quetiapine Fumarate [Seroquel -] 50 mg PO BID 01/12/18 


Sennosides [Senna] 2 tab PO DAILY 01/12/18 


Trazodone HCl 25 mg PO HS 01/12/18 











Review of Systems


Unable to obtain ROS, reason: unable to obtain





Physical Exam


Vital Signs: 


 Vital Signs











Temperature  98.4 F   01/12/18 10:31


 


Pulse Rate  88   01/12/18 13:15


 


Respiratory Rate  16   01/12/18 13:15


 


Blood Pressure  110/71   01/12/18 13:15


 


O2 Sat by Pulse Oximetry (%)  97   01/12/18 13:15











Constitutional: Yes: Other


Eyes: Yes: Conjunctiva Clear


Cardiovascular: Yes: Regular Rate and Rhythm


Respiratory: Yes: Regular, CTA Bilaterally


Gastrointestinal: Yes: Normal Bowel Sounds, Soft


Musculoskeletal: Yes: Other


Extremities: Yes: Other


Neurological: Yes: Confusion, Lethargy


Psychiatric: Yes: Other


Labs: 


 CBC, BMP





 01/12/18 10:20 





 01/12/18 10:20 











Imaging





- Results


Chest X-ray: Report Reviewed, Image Reviewed


X-ray: Report Reviewed, Image Reviewed


Cat Scan: Report Reviewed, Image Reviewed





Assessment/Plan





Problem List





- Problems


(1) Acute hyponatremia


Code(s): E87.1 - HYPO-OSMOLALITY AND HYPONATREMIA   





(2) Acute metabolic encephalopathy


Code(s): G93.41 - METABOLIC ENCEPHALOPATHY   





(3) Acute hypokalemia


Code(s): E87.6 - HYPOKALEMIA   





(4) Acute kidney failurey


Code(s): N17.9 - ACUTE KIDNEY FAILURE, UNSPECIFIED   





(5) Diarrhea


Code(s): R19.7 - DIARRHEA, UNSPECIFIED   





(6) Hypotension


Code(s): I95.9 - HYPOTENSION, UNSPECIFIED   





(7) Fall


Code(s): W19.XXXA - UNSPECIFIED FALL, INITIAL ENCOUNTER   





(8) Hypothyroidism 


Code(s): E03.9 - HYPOTHYROIDISM, UNSPECIFIED   





(9) Anemia


Code(s): D64.9 - ANEMIA, UNSPECIFIED   


Qualifiers: 


   Anemia type: unspecified type   Qualified Code(s): D64.9 - Anemia, 

unspecified   





(10) Fracture of left hip


Code(s): S72.002A - FRACTURE OF UNSP PART OF NECK OF LEFT FEMUR, INIT   


Qualifiers: 


   Encounter type: subsequent encounter   Fracture type: closed 





11 sepsis





patient also having dirrhoea





plan


will start patient on abx


hydration


close monitoring


close watch on wbc and mental status


rest as per icu


hydration


sodium correction





cc time 45 min

## 2018-01-13 LAB
ANION GAP SERPL CALC-SCNC: 11 MMOL/L (ref 8–16)
ANION GAP SERPL CALC-SCNC: 12 MMOL/L (ref 8–16)
ANION GAP SERPL CALC-SCNC: 18 MMOL/L (ref 8–16)
ANION GAP SERPL CALC-SCNC: 21 MMOL/L (ref 8–16)
BASOPHILS # BLD: 0.3 % (ref 0–2)
BUN SERPL-MCNC: 113 MG/DL (ref 7–18)
BUN SERPL-MCNC: 125 MG/DL (ref 7–18)
BUN SERPL-MCNC: 141 MG/DL (ref 7–18)
BUN SERPL-MCNC: 142 MG/DL (ref 7–18)
CALCIUM SERPL-MCNC: 7.1 MG/DL (ref 8.5–10.1)
CALCIUM SERPL-MCNC: 7.4 MG/DL (ref 8.5–10.1)
CALCIUM SERPL-MCNC: 7.7 MG/DL (ref 8.5–10.1)
CALCIUM SERPL-MCNC: 7.8 MG/DL (ref 8.5–10.1)
CHLORIDE SERPL-SCNC: 79 MMOL/L (ref 98–107)
CHLORIDE SERPL-SCNC: 81 MMOL/L (ref 98–107)
CHLORIDE SERPL-SCNC: 86 MMOL/L (ref 98–107)
CHLORIDE SERPL-SCNC: 88 MMOL/L (ref 98–107)
CO2 SERPL-SCNC: 19 MMOL/L (ref 21–32)
CO2 SERPL-SCNC: 20 MMOL/L (ref 21–32)
CO2 SERPL-SCNC: 20 MMOL/L (ref 21–32)
CO2 SERPL-SCNC: 22 MMOL/L (ref 21–32)
CREAT SERPL-MCNC: 1.7 MG/DL (ref 0.55–1.02)
CREAT SERPL-MCNC: 1.7 MG/DL (ref 0.55–1.02)
CREAT SERPL-MCNC: 2.2 MG/DL (ref 0.55–1.02)
CREAT SERPL-MCNC: 2.4 MG/DL (ref 0.55–1.02)
DEPRECATED RDW RBC AUTO: 16.2 % (ref 11.6–15.6)
EOSINOPHIL # BLD: 0.1 % (ref 0–4.5)
GLUCOSE SERPL-MCNC: 115 MG/DL (ref 74–106)
GLUCOSE SERPL-MCNC: 131 MG/DL (ref 74–106)
GLUCOSE SERPL-MCNC: 136 MG/DL (ref 74–106)
GLUCOSE SERPL-MCNC: 159 MG/DL (ref 74–106)
HCT VFR BLD CALC: 33.2 % (ref 32.4–45.2)
HGB BLD-MCNC: 11.5 GM/DL (ref 10.7–15.3)
LYMPHOCYTES # BLD: 10.3 % (ref 8–40)
MAGNESIUM SERPL-MCNC: 3.6 MG/DL (ref 1.8–2.4)
MCH RBC QN AUTO: 26.3 PG (ref 25.7–33.7)
MCHC RBC AUTO-ENTMCNC: 34.4 G/DL (ref 32–36)
MCV RBC: 76.4 FL (ref 80–96)
MONOCYTES # BLD AUTO: 7.3 % (ref 3.8–10.2)
NEUTROPHILS # BLD: 82 % (ref 42.8–82.8)
PHOSPHATE SERPL-MCNC: 8.2 MG/DL (ref 2.5–4.9)
PLATELET # BLD AUTO: 462 K/MM3 (ref 134–434)
PMV BLD: 9.2 FL (ref 7.5–11.1)
POTASSIUM SERPLBLD-SCNC: 2.9 MMOL/L (ref 3.5–5.1)
POTASSIUM SERPLBLD-SCNC: 3.3 MMOL/L (ref 3.5–5.1)
POTASSIUM SERPLBLD-SCNC: 3.6 MMOL/L (ref 3.5–5.1)
POTASSIUM SERPLBLD-SCNC: 3.8 MMOL/L (ref 3.5–5.1)
RBC # BLD AUTO: 4.35 M/MM3 (ref 3.6–5.2)
SODIUM SERPL-SCNC: 118 MMOL/L (ref 136–145)
SODIUM SERPL-SCNC: 119 MMOL/L (ref 136–145)
SODIUM SERPL-SCNC: 119 MMOL/L (ref 136–145)
SODIUM SERPL-SCNC: 121 MMOL/L (ref 136–145)
WBC # BLD AUTO: 11 K/MM3 (ref 4–10)

## 2018-01-13 RX ADMIN — HEPARIN SODIUM SCH UNIT: 5000 INJECTION, SOLUTION INTRAVENOUS; SUBCUTANEOUS at 21:37

## 2018-01-13 RX ADMIN — DEXTROSE MONOHYDRATE SCH: 50 INJECTION, SOLUTION INTRAVENOUS at 18:36

## 2018-01-13 RX ADMIN — HEPARIN SODIUM SCH UNIT: 5000 INJECTION, SOLUTION INTRAVENOUS; SUBCUTANEOUS at 14:17

## 2018-01-13 RX ADMIN — DEXTROSE MONOHYDRATE SCH MLS/HR: 50 INJECTION, SOLUTION INTRAVENOUS at 09:49

## 2018-01-13 RX ADMIN — POTASSIUM CHLORIDE SCH MEQ: 200 INJECTION, SOLUTION INTRAVENOUS at 07:45

## 2018-01-13 RX ADMIN — CHLORHEXIDINE GLUCONATE SCH APPLIC: 213 SOLUTION TOPICAL at 21:37

## 2018-01-13 RX ADMIN — TAZOBACTAM SODIUM AND PIPERACILLIN SODIUM SCH MLS/HR: 250; 2 INJECTION, SOLUTION INTRAVENOUS at 01:48

## 2018-01-13 RX ADMIN — HEPARIN SODIUM SCH UNIT: 5000 INJECTION, SOLUTION INTRAVENOUS; SUBCUTANEOUS at 05:53

## 2018-01-13 RX ADMIN — TAZOBACTAM SODIUM AND PIPERACILLIN SODIUM SCH MLS/HR: 250; 2 INJECTION, SOLUTION INTRAVENOUS at 18:37

## 2018-01-13 RX ADMIN — MUPIROCIN SCH APPLIC: 20 OINTMENT TOPICAL at 09:50

## 2018-01-13 RX ADMIN — POTASSIUM CHLORIDE SCH MEQ: 200 INJECTION, SOLUTION INTRAVENOUS at 07:44

## 2018-01-13 RX ADMIN — POTASSIUM CHLORIDE SCH MEQ: 200 INJECTION, SOLUTION INTRAVENOUS at 05:53

## 2018-01-13 RX ADMIN — MUPIROCIN SCH APPLIC: 20 OINTMENT TOPICAL at 21:37

## 2018-01-13 RX ADMIN — TAZOBACTAM SODIUM AND PIPERACILLIN SODIUM SCH MLS/HR: 250; 2 INJECTION, SOLUTION INTRAVENOUS at 10:18

## 2018-01-13 NOTE — PN
Progress Note, Physician


History of Present Illness: 





continues to improve


starting to feel better


patient awake





- Current Medication List


Current Medications: 


Active Medications





Acetaminophen (Ofirmev Injection -)  600 mg IVPB Q6H PRN


   PRN Reason: PAIN LEVEL 1-5


Chlorhexidine Gluconate (Hibiclens For Decolonization -)  1 applic TP HS Count includes the [facility]


   Last Admin: 01/12/18 21:50 Dose:  1 applic


Heparin Sodium (Porcine) (Heparin -)  5,000 unit SQ TID Count includes the [facility]


   Last Admin: 01/13/18 14:17 Dose:  5,000 unit


Piperacillin/Tazobactam/Dextrose (Zosyn 2.25gm Ivpb (Premix))  2.25 gm in 50 

mls @ 100 mls/hr IVPB Q8H-IV [facility]


   PRN Reason: Protocol


   Last Admin: 01/13/18 10:18 Dose:  100 mls/hr


Dextrose (D5w -)  1,000 mls @ 125 mls/hr IV Q8H Count includes the [facility]


   Last Admin: 01/13/18 09:49 Dose:  125 mls/hr


Mupirocin (Bactroban Ointment (For Decolonization) -)  1 applic NS BID Count includes the [facility]


   Stop: 01/17/18 21:59


   Last Admin: 01/13/18 09:50 Dose:  1 applic











- Objective


Vital Signs: 


 Vital Signs











Temperature  97.7 F   01/13/18 14:05


 


Pulse Rate  80   01/13/18 14:00


 


Respiratory Rate  18   01/13/18 14:00


 


Blood Pressure  100/61   01/13/18 14:00


 


O2 Sat by Pulse Oximetry (%)  97   01/12/18 16:42











Constitutional: Yes: No Distress, Calm, Thin


Cardiovascular: Yes: Regular Rate and Rhythm


Respiratory: Yes: Regular, CTA Bilaterally, On Nasal O2


Gastrointestinal: Yes: Normal Bowel Sounds, Soft


Musculoskeletal: Yes: WNL


Extremities: Yes: WNL


Neurological: Yes: Alert, Other


Psychiatric: Yes: Alert


Labs: 


 CBC, BMP





 01/13/18 05:50 





 INR, PTT











INR  1.00  (0.82-1.09)   01/12/18  10:20    














Assessment/Plan





Problem List





- Problems


(1) Acute hyponatremia


Code(s): E87.1 - HYPO-OSMOLALITY AND HYPONATREMIA   





(2) Acute metabolic encephalopathy


Code(s): G93.41 - METABOLIC ENCEPHALOPATHY   





(3) Acute hypokalemia


Code(s): E87.6 - HYPOKALEMIA   





(4) Acute kidney failurey


Code(s): N17.9 - ACUTE KIDNEY FAILURE, UNSPECIFIED   





(5) Diarrhea


Code(s): R19.7 - DIARRHEA, UNSPECIFIED   





(6) Hypotension


Code(s): I95.9 - HYPOTENSION, UNSPECIFIED   





(7) Fall


Code(s): W19.XXXA - UNSPECIFIED FALL, INITIAL ENCOUNTER   





(8) Hypothyroidism 


Code(s): E03.9 - HYPOTHYROIDISM, UNSPECIFIED   





(9) Anemia


Code(s): D64.9 - ANEMIA, UNSPECIFIED   


Qualifiers: 


   Anemia type: unspecified type   Qualified Code(s): D64.9 - Anemia, 

unspecified   





(10) Fracture of left hip


Code(s): S72.002A - FRACTURE OF UNSP PART OF NECK OF LEFT FEMUR, INIT   


Qualifiers: 


   Encounter type: subsequent encounter   Fracture type: closed 





11 sepsis


mental status improving


patient stable


cx results noted





plan


continue abx


continue monitoring as per icu


sodium correction


rest as per primary team





cc time 45 min

## 2018-01-13 NOTE — PN
Progress Note, Physician


Chief Complaint: 


More alert, no c/o head ache, nausea or focal weakness





History of Present Illness: 


72 year old female with history of Dementia, frequent falls, hypothyroidism, 

Anemia and Left hip fracture(refused surgery) brought in from [facility] s/p fall/syncope and diarrhea. lab show sever Hyponatremia, Hypokalemia, 

dehydration and 








- Current Medication List


Current Medications: 


Active Medications





Acetaminophen (Ofirmev Injection -)  600 mg IVPB Q6H PRN


   PRN Reason: PAIN LEVEL 1-5


Chlorhexidine Gluconate (Hibiclens For Decolonization -)  1 applic TP HS [facility]


   Last Admin: 01/12/18 21:50 Dose:  1 applic


Heparin Sodium (Porcine) (Heparin -)  5,000 unit SQ TID [facility]


   Last Admin: 01/13/18 14:17 Dose:  5,000 unit


Piperacillin/Tazobactam/Dextrose (Zosyn 2.25gm Ivpb (Premix))  2.25 gm in 50 

mls @ 100 mls/hr IVPB Q8H-IV [facility]


   PRN Reason: Protocol


   Last Admin: 01/13/18 10:18 Dose:  100 mls/hr


Dextrose (D5w -)  1,000 mls @ 125 mls/hr IV Q8H [facility]


   Last Admin: 01/13/18 09:49 Dose:  125 mls/hr


Mupirocin (Bactroban Ointment (For Decolonization) -)  1 applic NS BID [facility]


   Stop: 01/17/18 21:59


   Last Admin: 01/13/18 09:50 Dose:  1 applic











- Objective


Vital Signs: 


 Vital Signs











Temperature  97.7 F   01/13/18 14:05


 


Pulse Rate  80   01/13/18 14:00


 


Respiratory Rate  18   01/13/18 14:00


 


Blood Pressure  100/61   01/13/18 14:00


 


O2 Sat by Pulse Oximetry (%)  97   01/12/18 16:42











Elderly frail F no in distress c/o Left Hip pain





HEENT: Mm dry, no anemia, no thrush





NECK: No JVD No Bruit





CHEST: CTA B/L





CVS; s1S2 R





ABD: no distention Bs +





EXT; No edema, feet Left Hip tenderness Pulses =2





CNS: alert non focal.











Labs: 


 CBC, BMP





 01/13/18 05:50 





 INR, PTT











INR  1.00  (0.82-1.09)   01/12/18  10:20    














Problem List





- Problems


(1) Acute metabolic encephalopathy


Assessment/Plan: 


Due to dehydartion and severe Hyponatremia, gradually improving.


Code(s): G93.41 - METABOLIC ENCEPHALOPATHY   





(2) Acute hyponatremia


Assessment/Plan: 


Severe Hyponatremia with Dehydration , on IV NS   Na gradually rising , close 

monitoring of mental status.


Code(s): E87.1 - HYPO-OSMOLALITY AND HYPONATREMIA   





(3) Acute hypokalemia


Assessment/Plan: 


Repletetd F/U K and Mag level.


Code(s): E87.6 - HYPOKALEMIA   





(4) Hypothyroidism


Assessment/Plan: 


F/U TSH on Levothyroxine


Code(s): E03.9 - HYPOTHYROIDISM, UNSPECIFIED   





(5) Fracture of left hip


Assessment/Plan: 


Opted for non operative treatment, pain control, no Wt bearing.


Code(s): S72.002A - FRACTURE OF UNSP PART OF NECK OF LEFT FEMUR, INIT   


Qualifiers: 


   Encounter type: subsequent encounter   Fracture type: closed 





(6) Anemia


Assessment/Plan: 


Chronic h/h stable


Code(s): D64.9 - ANEMIA, UNSPECIFIED   


Qualifiers: 


   Anemia type: unspecified type   Qualified Code(s): D64.9 - Anemia, 

unspecified   





(7) Diarrhea


Assessment/Plan: 


F/U stool W/U including C Diff


Code(s): R19.7 - DIARRHEA, UNSPECIFIED

## 2018-01-13 NOTE — PN
Progress Note (short form)





- Note


Progress Note: 





PULMONARY/CCM





Pt seen and examined in the ICU. Mental status improving. AM sodium now 119. 

Denies headache, nausea or vomiting. No shortness of breath or chest pain.





 Last Vital Signs











Temp Pulse Resp BP Pulse Ox


 


 98.2 F   78   11 L  98/64   97 


 


 01/13/18 02:00  01/13/18 08:00  01/13/18 08:00  01/13/18 08:00  01/12/18 16:42








 Intake & Output











 01/10/18 01/11/18 01/12/18 01/13/18





 23:59 23:59 23:59 23:59


 


Intake Total   800 400


 


Output Total   700 


 


Balance   100 400


 


Weight   38.697 kg 40.098 kg








Gen:  awake, alert


HEENT: dry mucous membranes


Heart: RRR


Lung: decreased breath sounds at the bases


Abd: soft, nontender


Ext: no edema





 CBC, BMP





 01/13/18 05:50 





 01/13/18 05:50 





Active Medications





Acetaminophen (Ofirmev Injection -)  600 mg IVPB Q6H PRN


   PRN Reason: PAIN LEVEL 1-5


Chlorhexidine Gluconate (Hibiclens For Decolonization -)  1 applic TP HS [facility]


   Last Admin: 01/12/18 21:50 Dose:  1 applic


Heparin Sodium (Porcine) (Heparin -)  5,000 unit SQ TID [facility]


   Last Admin: 01/13/18 05:53 Dose:  5,000 unit


Piperacillin/Tazobactam/Dextrose (Zosyn 2.25gm Ivpb (Premix))  2.25 gm in 50 

mls @ 100 mls/hr IVPB Q8H-IV [facility]


   PRN Reason: Protocol


   Last Admin: 01/13/18 10:18 Dose:  100 mls/hr


Dextrose (D5w -)  1,000 mls @ 125 mls/hr IV Q8H [facility]


   Last Admin: 01/13/18 09:49 Dose:  125 mls/hr


Mupirocin (Bactroban Ointment (For Decolonization) -)  1 applic NS BID [facility]


   Stop: 01/17/18 21:59


   Last Admin: 01/13/18 09:50 Dose:  1 applic





A/P


Symptomatic Severe Hyponatremia


Acute Kidney Injury


+ C diff


r/o UTI


CAD


HTN


Dementia





-  fluid restriction


-  IVF, ddAVP per renal


-  monitor lytes closely


-  continue antibiotics


-  f/u cultures


-  PO as tolerated


-  DVT prophylaxis


-  continue ICU monitoring











critical care time spent in reviewing chart, evaluating patient and formulating 

plan 35min

## 2018-01-13 NOTE — PN
Progress Note (short form)





- Note


Progress Note: 





Renal follow up for Severe Hyponatremia and renal failure





Pt seen and examined in the ICU


awake and alert today


no seizures overnight


serum na corrected to 119


having diarrhea overnight


denies any pain, sob, N/V


no fever or chills





 Vital Signs











Temperature  98.2 F   01/13/18 02:00


 


Pulse Rate  78   01/13/18 08:00


 


Respiratory Rate  11 L  01/13/18 08:00


 


Blood Pressure  98/64   01/13/18 08:00


 


O2 Sat by Pulse Oximetry (%)  97   01/12/18 16:42








 Intake & Output











 01/10/18 01/11/18 01/12/18 01/13/18





 23:59 23:59 23:59 23:59


 


Intake Total   800 400


 


Output Total   700 


 


Balance   100 400


 


Weight   38.697 kg 40.098 kg








NAD


awake and alert


Dry MM


RRR


CTA


soft NT/ND


No LE edema





 CBC, BMP





 01/13/18 05:50 





 01/13/18 05:50 





 Current Medications





Acetaminophen (Tylenol Suppository -)  650 mg IL Q6H PRN


   PRN Reason: FEVER


Chlorhexidine Gluconate (Hibiclens For Decolonization -)  1 applic TP HS [facility]


   Last Admin: 01/12/18 21:50 Dose:  1 applic


Desmopressin Acetate (Ddavp Injection -)  2 mcg IVPB ONCE ONE


   Stop: 01/13/18 09:02


Heparin Sodium (Porcine) (Heparin -)  5,000 unit SQ TID [facility]


   Last Admin: 01/13/18 05:53 Dose:  5,000 unit


Piperacillin/Tazobactam/Dextrose (Zosyn 2.25gm Ivpb (Premix))  2.25 gm in 50 

mls @ 100 mls/hr IVPB Q8H-IV [facility]


   PRN Reason: Protocol


   Last Admin: 01/13/18 01:48 Dose:  100 mls/hr


Dextrose (D5w -)  1,000 mls @ 125 mls/hr IV .Q8H [facility]


   Stop: 01/13/18 11:14


Mupirocin (Bactroban Ointment (For Decolonization) -)  1 applic NS BID [facility]


   Stop: 01/17/18 21:59


   Last Admin: 01/12/18 21:50 Dose:  1 applic





72 year old woman with PMhx of CKD (Hx of SKIP, Hydronephrotic right Kidney), 

Dementia, Hypertension who presented from NH with syncope and AMS as inpatient 

and found to have hyponatremia with serum Na of 109 and SKIP.





#Acute Symptomatic Hypovolemic Hyponatremia


Serum Na corrected to 199 in less then 24 hours


will give DDAVP 2mcg IV and D5W 250cc over 2 hours to bring Na back down to ~

117 by noon


pt with high BUN/Cr ration and clinically still very hypovolemic 


pt is no longer symptomatic as mental status has improved 


will need normal saline but with caution as pt can rapidly correct Na 


ICU monitoring for now


frequent monitoring of Na levels


aim is to correct Na 8-10 in 24 hours and by 16 in 48 hours





#Acute on Chronic Renal insufficiency


BUN rising, likely due ot diarrhea and continued volume loss


Cr improved, CO2 improved


no overt signs of uremia at this time


plan for volume infusion with NS once Na overcorrection controlled 





Thank you 


Dustin An DO

## 2018-01-13 NOTE — EKG
Test Reason : 

Blood Pressure : ***/*** mmHG

Vent. Rate : 087 BPM     Atrial Rate : 087 BPM

   P-R Int : 136 ms          QRS Dur : 094 ms

    QT Int : 396 ms       P-R-T Axes : 059 053 074 degrees

   QTc Int : 476 ms

 

NORMAL SINUS RHYTHM

POSSIBLE LEFT ATRIAL ENLARGEMENT

JUNCTIONAL ST DEPRESSION, PROBABLY NORMAL

BORDERLINE ECG

WHEN COMPARED WITH ECG OF 22-NOV-2017 02:15,

NO SIGNIFICANT CHANGE WAS FOUND

Confirmed by DEIDRE VALENZUELA MD ([id number]) on 1/13/2018 5:16:38 PM

 

Referred By:             Confirmed By:DEIDRE VALENZUELA MD

## 2018-01-14 LAB
ALBUMIN SERPL-MCNC: 3.1 G/DL (ref 3.4–5)
ALP SERPL-CCNC: 135 U/L (ref 45–117)
ALT SERPL-CCNC: 16 U/L (ref 12–78)
ANION GAP SERPL CALC-SCNC: 11 MMOL/L (ref 8–16)
ANION GAP SERPL CALC-SCNC: 12 MMOL/L (ref 8–16)
AST SERPL-CCNC: 16 U/L (ref 15–37)
BASOPHILS # BLD: 0.6 % (ref 0–2)
BILIRUB SERPL-MCNC: 0.4 MG/DL (ref 0.2–1)
BUN SERPL-MCNC: 72 MG/DL (ref 7–18)
BUN SERPL-MCNC: 92 MG/DL (ref 7–18)
CALCIUM SERPL-MCNC: 7.5 MG/DL (ref 8.5–10.1)
CALCIUM SERPL-MCNC: 7.8 MG/DL (ref 8.5–10.1)
CHLORIDE SERPL-SCNC: 91 MMOL/L (ref 98–107)
CHLORIDE SERPL-SCNC: 96 MMOL/L (ref 98–107)
CO2 SERPL-SCNC: 20 MMOL/L (ref 21–32)
CO2 SERPL-SCNC: 22 MMOL/L (ref 21–32)
CREAT SERPL-MCNC: 1.1 MG/DL (ref 0.55–1.02)
CREAT SERPL-MCNC: 1.3 MG/DL (ref 0.55–1.02)
DEPRECATED RDW RBC AUTO: 16.4 % (ref 11.6–15.6)
EOSINOPHIL # BLD: 0.8 % (ref 0–4.5)
GLUCOSE SERPL-MCNC: 140 MG/DL (ref 74–106)
GLUCOSE SERPL-MCNC: 92 MG/DL (ref 74–106)
HCT VFR BLD CALC: 32 % (ref 32.4–45.2)
HGB BLD-MCNC: 11.2 GM/DL (ref 10.7–15.3)
LYMPHOCYTES # BLD: 16.2 % (ref 8–40)
MAGNESIUM SERPL-MCNC: 3.6 MG/DL (ref 1.8–2.4)
MCH RBC QN AUTO: 26.9 PG (ref 25.7–33.7)
MCHC RBC AUTO-ENTMCNC: 35 G/DL (ref 32–36)
MCV RBC: 76.9 FL (ref 80–96)
MONOCYTES # BLD AUTO: 7.5 % (ref 3.8–10.2)
NEUTROPHILS # BLD: 74.9 % (ref 42.8–82.8)
PHOSPHATE SERPL-MCNC: 4.1 MG/DL (ref 2.5–4.9)
PLATELET # BLD AUTO: 388 K/MM3 (ref 134–434)
PMV BLD: 9.1 FL (ref 7.5–11.1)
POTASSIUM SERPLBLD-SCNC: 2.9 MMOL/L (ref 3.5–5.1)
POTASSIUM SERPLBLD-SCNC: 3.5 MMOL/L (ref 3.5–5.1)
PROT SERPL-MCNC: 7.2 G/DL (ref 6.4–8.2)
RBC # BLD AUTO: 4.17 M/MM3 (ref 3.6–5.2)
SODIUM SERPL-SCNC: 124 MMOL/L (ref 136–145)
SODIUM SERPL-SCNC: 128 MMOL/L (ref 136–145)
WBC # BLD AUTO: 8.2 K/MM3 (ref 4–10)

## 2018-01-14 RX ADMIN — HEPARIN SODIUM SCH UNIT: 5000 INJECTION, SOLUTION INTRAVENOUS; SUBCUTANEOUS at 17:07

## 2018-01-14 RX ADMIN — TAZOBACTAM SODIUM AND PIPERACILLIN SODIUM SCH MLS/HR: 250; 2 INJECTION, SOLUTION INTRAVENOUS at 09:03

## 2018-01-14 RX ADMIN — DEXTROSE MONOHYDRATE SCH: 50 INJECTION, SOLUTION INTRAVENOUS at 02:29

## 2018-01-14 RX ADMIN — HEPARIN SODIUM SCH UNIT: 5000 INJECTION, SOLUTION INTRAVENOUS; SUBCUTANEOUS at 21:46

## 2018-01-14 RX ADMIN — HEPARIN SODIUM SCH UNIT: 5000 INJECTION, SOLUTION INTRAVENOUS; SUBCUTANEOUS at 05:31

## 2018-01-14 RX ADMIN — TAZOBACTAM SODIUM AND PIPERACILLIN SODIUM SCH MLS/HR: 250; 2 INJECTION, SOLUTION INTRAVENOUS at 02:29

## 2018-01-14 RX ADMIN — TAZOBACTAM SODIUM AND PIPERACILLIN SODIUM SCH MLS/HR: 250; 2 INJECTION, SOLUTION INTRAVENOUS at 17:21

## 2018-01-14 RX ADMIN — MUPIROCIN SCH APPLIC: 20 OINTMENT TOPICAL at 09:03

## 2018-01-14 NOTE — PN
Progress Note, Physician


History of Present Illness: 





much more awake and alert


conversing


feels much better


no issues


still with low sodium but improving





- Current Medication List


Current Medications: 


Active Medications





Acetaminophen (Ofirmev Injection -)  600 mg IVPB Q6H PRN


   PRN Reason: PAIN LEVEL 1-5


   Last Admin: 01/13/18 20:47 Dose:  600 mg


Chlorhexidine Gluconate (Hibiclens For Decolonization -)  1 applic TP HS [facility]


   Last Admin: 01/13/18 21:37 Dose:  1 applic


Heparin Sodium (Porcine) (Heparin -)  5,000 unit SQ TID [facility]


   Last Admin: 01/14/18 05:31 Dose:  5,000 unit


Piperacillin/Tazobactam/Dextrose (Zosyn 2.25gm Ivpb (Premix))  2.25 gm in 50 

mls @ 100 mls/hr IVPB Q8H-IV [facility]


   PRN Reason: Protocol


   Last Admin: 01/14/18 09:03 Dose:  100 mls/hr


Sodium Chloride (Normal Saline -)  1,000 mls @ 75 mls/hr IV ASDIR [facility]


   Last Admin: 01/14/18 09:03 Dose:  75 mls/hr


Mupirocin (Bactroban Ointment (For Decolonization) -)  1 applic NS BID [facility]


   Stop: 01/17/18 21:59


   Last Admin: 01/14/18 09:03 Dose:  1 applic











- Objective


Vital Signs: 


 Vital Signs











Temperature  97.2 F L  01/14/18 10:00


 


Pulse Rate  82   01/14/18 10:00


 


Respiratory Rate  11 L  01/14/18 10:00


 


Blood Pressure  128/77   01/14/18 10:00


 


O2 Sat by Pulse Oximetry (%)  100   01/14/18 06:00











Constitutional: Yes: No Distress, Calm, Thin


Cardiovascular: Yes: Regular Rate and Rhythm


Respiratory: Yes: Regular, CTA Bilaterally


Gastrointestinal: Yes: Normal Bowel Sounds, Soft


Musculoskeletal: Yes: WNL


Extremities: Yes: WNL


Neurological: Yes: Alert, Oriented


Psychiatric: Yes: Alert


Labs: 


 CBC, BMP





 01/14/18 06:00 





 01/14/18 06:00 





 INR, PTT











INR  1.00  (0.82-1.09)   01/12/18  10:20    














Assessment/Plan





Problem List





- Problems


(1) Acute hyponatremia


Code(s): E87.1 - HYPO-OSMOLALITY AND HYPONATREMIA   





(2) Acute metabolic encephalopathy


Code(s): G93.41 - METABOLIC ENCEPHALOPATHY   





(3) Acute hypokalemia


Code(s): E87.6 - HYPOKALEMIA   





(4) Acute kidney failurey


Code(s): N17.9 - ACUTE KIDNEY FAILURE, UNSPECIFIED   





(5) Diarrhea


Code(s): R19.7 - DIARRHEA, UNSPECIFIED   





(6) Hypotension


Code(s): I95.9 - HYPOTENSION, UNSPECIFIED   





(7) Fall


Code(s): W19.XXXA - UNSPECIFIED FALL, INITIAL ENCOUNTER   





(8) Hypothyroidism 


Code(s): E03.9 - HYPOTHYROIDISM, UNSPECIFIED   





(9) Anemia


Code(s): D64.9 - ANEMIA, UNSPECIFIED   


Qualifiers: 


   Anemia type: unspecified type   Qualified Code(s): D64.9 - Anemia, 

unspecified   





(10) Fracture of left hip


Code(s): S72.002A - FRACTURE OF UNSP PART OF NECK OF LEFT FEMUR, INIT   


Qualifiers: 


   Encounter type: subsequent encounter   Fracture type: closed 





11 sepsis








plan


continue abx


continue monitoring as per icu


sodium correction


rest as per primary team


will start deescalating from tomorrow





cc time 45 min

## 2018-01-14 NOTE — PN
Progress Note (short form)





- Note


Progress Note: 





Renal follow up for Severe Hyponatremia and renal failure





Pt seen and examined in the ICU


awake and alert


continues to have a large amount of diarrhea


on IVF


Mental status much improved


not eating much


no seizures, confusion, lethargy


 


 Vital Signs











Temperature  97.3 F L  01/14/18 06:00


 


Pulse Rate  73   01/14/18 06:00


 


Respiratory Rate  17   01/14/18 06:00


 


Blood Pressure  107/77   01/14/18 06:00


 


O2 Sat by Pulse Oximetry (%)  100   01/14/18 06:00








 Intake & Output











 01/11/18 01/12/18 01/13/18 01/14/18





 23:59 23:59 23:59 23:59


 


Intake Total  800 1770 560


 


Output Total  700 1300 500


 


Balance  100 470 60


 


Weight  38.697 kg 40.098 kg 39.735 kg











NAD


awake and alert


Dry MM


RRR


CTA


soft NT/ND


No LE edema





 


 CBC, BMP





 01/14/18 06:00 





 01/14/18 06:00 





 Current Medications





Acetaminophen (Ofirmev Injection -)  600 mg IVPB Q6H PRN


   PRN Reason: PAIN LEVEL 1-5


   Last Admin: 01/13/18 20:47 Dose:  600 mg


Chlorhexidine Gluconate (Hibiclens For Decolonization -)  1 applic TP HS [facility]


   Last Admin: 01/13/18 21:37 Dose:  1 applic


Heparin Sodium (Porcine) (Heparin -)  5,000 unit SQ TID [facility]


   Last Admin: 01/14/18 05:31 Dose:  5,000 unit


Piperacillin/Tazobactam/Dextrose (Zosyn 2.25gm Ivpb (Premix))  2.25 gm in 50 

mls @ 100 mls/hr IVPB Q8H-IV [facility]


   PRN Reason: Protocol


   Last Admin: 01/14/18 02:29 Dose:  100 mls/hr


Sodium Chloride (Normal Saline -)  1,000 mls @ 75 mls/hr IV ASDIR [facility]


Mupirocin (Bactroban Ointment (For Decolonization) -)  1 applic NS BID [facility]


   Stop: 01/17/18 21:59


   Last Admin: 01/13/18 21:37 Dose:  1 applic


Potassium Chloride (Potassium Chloride 20 Meq Premix Ivpb -)  20 meq IVPB ONCE 

ONE


   Stop: 01/14/18 07:35


Potassium Chloride (Potassium Chloride Oral Liquid)  40 meq PO ONCE ONE


   Stop: 01/14/18 07:48











72 year old woman with PMhx of CKD (Hx of SKIP, Hydronephrotic right Kidney), 

Dementia, Hypertension who presented from NH with syncope and AMS as inpatient 

and found to have hyponatremia with serum Na of 109 and SKIP.





#Acute Symptomatic Hypovolemic Hyponatremia


Serum Na is improving at a appropriate rate


continue normal saline at 75cc per hour


Check serum Na this afternoon


no indication for 3% saline





#Acute Renal Failure with pereseved tubular function


renal function improving with IVF


FeNa < 1%


no evidence of TMA to suggest HUS in setting of diarrhea 


continue to trend BUN/Cr


no indication for RRT at the present time





#Metabolic acidosis in setting of diarrhea


no indication for bicarb at this time 








Thank you 


Dustin An DO

## 2018-01-14 NOTE — PN
Progress Note, Physician


Chief Complaint: 


More alert, no c/o head ache, nausea or focal weakness





History of Present Illness: 


72 year old female with history of Dementia, frequent falls, hypothyroidism, 

Anemia and Left hip fracture(refused surgery) brought in from [facility] s/p fall/syncope and diarrhea. lab show sever Hyponatremia, Hypokalemia, 

dehydration and 








- Current Medication List


Current Medications: 


Active Medications





Acetaminophen (Ofirmev Injection -)  600 mg IVPB Q6H PRN


   PRN Reason: PAIN LEVEL 1-5


   Last Admin: 01/13/18 20:47 Dose:  600 mg


Chlorhexidine Gluconate (Hibiclens For Decolonization -)  1 applic TP HS [facility]


   Last Admin: 01/13/18 21:37 Dose:  1 applic


Heparin Sodium (Porcine) (Heparin -)  5,000 unit SQ TID [facility]


   Last Admin: 01/14/18 05:31 Dose:  5,000 unit


Piperacillin/Tazobactam/Dextrose (Zosyn 2.25gm Ivpb (Premix))  2.25 gm in 50 

mls @ 100 mls/hr IVPB Q8H-IV [facility]


   PRN Reason: Protocol


   Last Admin: 01/14/18 09:03 Dose:  100 mls/hr


Sodium Chloride (Normal Saline -)  1,000 mls @ 75 mls/hr IV ASDIR [facility]


   Last Admin: 01/14/18 09:03 Dose:  75 mls/hr


Mupirocin (Bactroban Ointment (For Decolonization) -)  1 applic NS BID [facility]


   Stop: 01/17/18 21:59


   Last Admin: 01/14/18 09:03 Dose:  1 applic











- Objective


Vital Signs: 


 Vital Signs











Temperature  97.2 F L  01/14/18 10:00


 


Pulse Rate  77   01/14/18 12:00


 


Respiratory Rate  13   01/14/18 12:00


 


Blood Pressure  111/64   01/14/18 12:00


 


O2 Sat by Pulse Oximetry (%)  100   01/14/18 09:00




















Elderly frail F no in distress c/o Left Hip pain





HEENT: Mm dry, no anemia, no thrush





NECK: No JVD No Bruit





CHEST: CTA B/L





CVS; s1S2 R





ABD: no distention Bs +





EXT; No edema, feet Left Hip tenderness Pulses =2





CNS: alert non focal.


Labs: 


 CBC, BMP





 01/14/18 06:00 





 01/14/18 06:00 





 INR, PTT











INR  1.00  (0.82-1.09)   01/12/18  10:20    














Problem List





- Problems


(1) Acute metabolic encephalopathy


Assessment/Plan: 


Due to dehydartion and severe Hyponatremia, gradually improving.


Code(s): G93.41 - METABOLIC ENCEPHALOPATHY   





(2) Acute hyponatremia


Assessment/Plan: 


Severe Hyponatremia with Dehydration , on IV NS   Na gradually rising , close 

monitoring of mental status.


Code(s): E87.1 - HYPO-OSMOLALITY AND HYPONATREMIA   





(3) Acute hypokalemia








(4) Hypothyroidism


Assessment/Plan: 


F/U TSH on Levothyroxine


Code(s): E03.9 - HYPOTHYROIDISM, UNSPECIFIED   





(5) Fracture of left hip


Assessment/Plan: 


Opted for non operative treatment, pain control, no Wt bearing.


Code(s): S72.002A - FRACTURE OF UNSP PART OF NECK OF LEFT FEMUR, INIT   


Qualifiers: 


   Encounter type: subsequent encounter   Fracture type: closed 





(6) Anemia


Assessment/Plan: 


Chronic h/h stable


Code(s): D64.9 - ANEMIA, UNSPECIFIED   


Qualifiers: 


   Anemia type: unspecified type   Qualified Code(s): D64.9 - Anemia, 

unspecified   





(7) Diarrhea


Assessment/Plan: 


F/U stool W/U including C Diff


Code(s): R19.7 - DIARRHEA, UNSPECIFIED   





(8) SKIP (acute kidney injury)


Assessment/Plan: 


Improving with IV  hydration


Code(s): N17.9 - ACUTE KIDNEY FAILURE, UNSPECIFIED

## 2018-01-14 NOTE — PN
Progress Note (short form)





- Note


Progress Note: 





PULMONARY/CCM





Pt seen and examined in the ICU. Mental status continues to improve. AM sodium 

now 124. Denies headache, nausea or vomiting. No shortness of breath or chest 

pain.





 Last Vital Signs











Temp Pulse Resp BP Pulse Ox


 


 97.3 F L  84   11 L  97/68   100 


 


 01/14/18 06:00  01/14/18 08:00  01/14/18 08:00  01/14/18 08:00  01/14/18 06:00








 Intake & Output











 01/11/18 01/12/18 01/13/18 01/14/18





 23:59 23:59 23:59 23:59


 


Intake Total  800 1770 560


 


Output Total  700 1300 500


 


Balance  100 470 60


 


Weight  38.697 kg 40.098 kg 39.735 kg











Gen:  awake, alert


HEENT: dry mucous membranes


Heart: RRR


Lung: decreased breath sounds at the bases


Abd: soft, nontender


Ext: no edema





 CBC, BMP





 01/14/18 06:00 





 01/14/18 06:00 





Active Medications





Acetaminophen (Ofirmev Injection -)  600 mg IVPB Q6H PRN


   PRN Reason: PAIN LEVEL 1-5


   Last Admin: 01/13/18 20:47 Dose:  600 mg


Chlorhexidine Gluconate (Hibiclens For Decolonization -)  1 applic TP HS [facility]


   Last Admin: 01/13/18 21:37 Dose:  1 applic


Heparin Sodium (Porcine) (Heparin -)  5,000 unit SQ TID [facility]


   Last Admin: 01/14/18 05:31 Dose:  5,000 unit


Piperacillin/Tazobactam/Dextrose (Zosyn 2.25gm Ivpb (Premix))  2.25 gm in 50 

mls @ 100 mls/hr IVPB Q8H-IV [facility]


   PRN Reason: Protocol


   Last Admin: 01/14/18 09:03 Dose:  100 mls/hr


Sodium Chloride (Normal Saline -)  1,000 mls @ 75 mls/hr IV ASDIR [facility]


   Last Admin: 01/14/18 09:03 Dose:  75 mls/hr


Mupirocin (Bactroban Ointment (For Decolonization) -)  1 applic NS BID [facility]


   Stop: 01/17/18 21:59


   Last Admin: 01/14/18 09:03 Dose:  1 applic








A/P


Symptomatic Severe Hyponatremia improving


Acute Kidney Injury


+ C diff


r/o UTI


CAD


HTN


Dementia





-  fluid restriction


-  IVF per renal


-  monitor lytes closely


-  replete K


-  continue antibiotics


-  f/u cultures


-  d/c central line


-  PO as tolerated


-  DVT prophylaxis


-  can monitor on floor











critical care time spent in reviewing chart, evaluating patient and formulating 

plan 35 min

## 2018-01-15 LAB
ANION GAP SERPL CALC-SCNC: 14 MMOL/L (ref 8–16)
BASOPHILS # BLD: 0.7 % (ref 0–2)
BUN SERPL-MCNC: 52 MG/DL (ref 7–18)
CALCIUM SERPL-MCNC: 7.8 MG/DL (ref 8.5–10.1)
CHLORIDE SERPL-SCNC: 99 MMOL/L (ref 98–107)
CO2 SERPL-SCNC: 18 MMOL/L (ref 21–32)
CREAT SERPL-MCNC: 1.1 MG/DL (ref 0.55–1.02)
DEPRECATED RDW RBC AUTO: 16.5 % (ref 11.6–15.6)
EOSINOPHIL # BLD: 1.2 % (ref 0–4.5)
GLUCOSE SERPL-MCNC: 94 MG/DL (ref 74–106)
HCT VFR BLD CALC: 32.1 % (ref 32.4–45.2)
HGB BLD-MCNC: 10.8 GM/DL (ref 10.7–15.3)
LYMPHOCYTES # BLD: 21.4 % (ref 8–40)
MAGNESIUM SERPL-MCNC: 2.9 MG/DL (ref 1.8–2.4)
MCH RBC QN AUTO: 26 PG (ref 25.7–33.7)
MCHC RBC AUTO-ENTMCNC: 33.5 G/DL (ref 32–36)
MCV RBC: 77.6 FL (ref 80–96)
MONOCYTES # BLD AUTO: 9.8 % (ref 3.8–10.2)
NEUTROPHILS # BLD: 66.9 % (ref 42.8–82.8)
PHOSPHATE SERPL-MCNC: 3.3 MG/DL (ref 2.5–4.9)
PLATELET # BLD AUTO: 343 K/MM3 (ref 134–434)
PMV BLD: 9.1 FL (ref 7.5–11.1)
POTASSIUM SERPLBLD-SCNC: 3.3 MMOL/L (ref 3.5–5.1)
RBC # BLD AUTO: 4.14 M/MM3 (ref 3.6–5.2)
SODIUM SERPL-SCNC: 131 MMOL/L (ref 136–145)
WBC # BLD AUTO: 8.2 K/MM3 (ref 4–10)

## 2018-01-15 RX ADMIN — HEPARIN SODIUM SCH UNIT: 5000 INJECTION, SOLUTION INTRAVENOUS; SUBCUTANEOUS at 22:32

## 2018-01-15 RX ADMIN — POTASSIUM CHLORIDE SCH MEQ: 20 SOLUTION ORAL at 13:36

## 2018-01-15 RX ADMIN — POTASSIUM CHLORIDE SCH MEQ: 20 SOLUTION ORAL at 22:31

## 2018-01-15 RX ADMIN — VANCOMYCIN HYDROCHLORIDE SCH MG: 250 CAPSULE ORAL at 17:57

## 2018-01-15 RX ADMIN — TAZOBACTAM SODIUM AND PIPERACILLIN SODIUM SCH MLS/HR: 250; 2 INJECTION, SOLUTION INTRAVENOUS at 02:10

## 2018-01-15 RX ADMIN — SODIUM CHLORIDE SCH MLS/HR: 9 INJECTION, SOLUTION INTRAVENOUS at 14:32

## 2018-01-15 RX ADMIN — HEPARIN SODIUM SCH UNIT: 5000 INJECTION, SOLUTION INTRAVENOUS; SUBCUTANEOUS at 13:37

## 2018-01-15 RX ADMIN — TAZOBACTAM SODIUM AND PIPERACILLIN SODIUM SCH MLS/HR: 250; 2 INJECTION, SOLUTION INTRAVENOUS at 10:10

## 2018-01-15 RX ADMIN — HEPARIN SODIUM SCH UNIT: 5000 INJECTION, SOLUTION INTRAVENOUS; SUBCUTANEOUS at 06:23

## 2018-01-15 NOTE — PN
Progress Note (short form)





- Note


Progress Note: 


Awake and alert, confused. No CP or SOB. 


No acute events overnight.  





 Intake & Output











 01/12/18 01/13/18 01/14/18 01/15/18





 23:59 23:59 23:59 23:59


 


Intake Total 800 1770 960 700


 


Output Total 700 1300 1800 200


 


Balance 100 470 -840 500


 


Weight 85 lb 5 oz 88 lb 6.4 oz 87 lb 9.6 oz 83 lb 8 oz








 Last Vital Signs











Temp Pulse Resp BP Pulse Ox


 


 -97.7 F L  73   18   105/65   100 


 


 01/15/18 06:00  01/15/18 06:00  01/15/18 06:00  01/15/18 06:00  01/14/18 21:00








Active Medications





Acetaminophen (Ofirmev Injection -)  600 mg IVPB Q6H PRN


   PRN Reason: PAIN LEVEL 1-5


   Last Admin: 01/14/18 22:10 Dose:  600 mg


Heparin Sodium (Porcine) (Heparin -)  5,000 unit SQ TID [facility]


   Last Admin: 01/15/18 06:23 Dose:  5,000 unit


Piperacillin/Tazobactam/Dextrose (Zosyn 2.25gm Ivpb (Premix))  2.25 gm in 50 

mls @ 100 mls/hr IVPB Q8H-IV [facility]


   PRN Reason: Protocol


   Last Admin: 01/15/18 10:10 Dose:  100 mls/hr


Sodium Chloride (Normal Saline -)  1,000 mls @ 50 mls/hr IV ASDIR [facility]


   Last Admin: 01/14/18 23:50 Dose:  50 mls/hr











Gen:  awake, alert


HEENT: dry mucous membranes


Heart: RRR


Lung: decreased breath sounds at the bases


Abd: soft, nontender


Ext: no edema





 


 Laboratory Results - last 24 hr











  01/14/18 01/15/18 01/15/18





  13:40 05:25 05:25


 


WBC   8.2 


 


RBC   4.14 


 


Hgb   10.8 


 


Hct   32.1 L 


 


MCV   77.6 L 


 


MCH   26.0 


 


MCHC   33.5 


 


RDW   16.5 H 


 


Plt Count   343 


 


MPV   9.1 


 


Neutrophils %   66.9 


 


Lymphocytes %   21.4  D 


 


Monocytes %   9.8 


 


Eosinophils %   1.2 


 


Basophils %   0.7 


 


Sodium  128 L   131 L


 


Potassium  3.5   3.3 L


 


Chloride  96 L   99


 


Carbon Dioxide  20 L   18 L


 


Anion Gap  12   14


 


BUN  72 H   52 H


 


Creatinine  1.1 H   1.1 H


 


Random Glucose  140 H   94


 


Calcium  7.8 L   7.8 L


 


Phosphorus    3.3


 


Magnesium    2.9 H














A/P


Symptomatic Severe Hyponatremia improving


Acute Kidney Injury


+ C diff


r/o UTI


CAD


HTN


Dementia





-  monitor lytes closely


-  fluid restriction 


-  replete K


-  continue antibiotics


-  PO as tolerated


-  Aspiration precautions 


-  DVT prophylaxis





Dr Rocha

## 2018-01-15 NOTE — PN
Progress Note, Physician


Chief Complaint: 


More alert, no c/o head ache, nausea or focal weakness





History of Present Illness: 


72 year old female with history of Dementia, frequent falls, hypothyroidism, 

Anemia and Left hip fracture(refused surgery) brought in from [facility] s/p fall/syncope and diarrhea. lab show sever Hyponatremia, Hypokalemia, 

dehydration with Diarrhea. 








- Current Medication List


Current Medications: 


Active Medications





Acetaminophen (Ofirmev Injection -)  600 mg IVPB Q6H PRN


   PRN Reason: PAIN LEVEL 1-5


   Last Admin: 01/14/18 22:10 Dose:  600 mg


Heparin Sodium (Porcine) (Heparin -)  5,000 unit SQ TID [facility]


   Last Admin: 01/15/18 06:23 Dose:  5,000 unit


Piperacillin/Tazobactam/Dextrose (Zosyn 2.25gm Ivpb (Premix))  2.25 gm in 50 

mls @ 100 mls/hr IVPB Q8H-IV [facility]


   PRN Reason: Protocol


   Last Admin: 01/15/18 10:10 Dose:  100 mls/hr


Sodium Chloride (Normal Saline -)  1,000 mls @ 83 mls/hr IV ASDIR [facility]


Potassium Chloride (Potassium Chloride Oral Liquid)  40 meq PO BID [facility]











- Objective


Vital Signs: 


 Vital Signs











Temperature  97.9 F   01/15/18 08:20


 


Pulse Rate  77   01/15/18 08:20


 


Respiratory Rate  18   01/15/18 08:20


 


Blood Pressure  107/71   01/15/18 08:20


 


O2 Sat by Pulse Oximetry (%)  100   01/14/18 21:00




















Elderly  F frail, not in distress, more alert, not in distress.





HEENT: Mm moist, no anemia, PERRLA EOMI





NECK; No JVD No Bruit





CHEST: CTA B/L





CVS: S1S2 R no m/g/r





ABD:  no distention, non tender Bs +





EXT: No edema, contracture





CNS:  no interval changes











Labs: 


 CBC, BMP





 01/15/18 05:25 





 01/15/18 05:25 





 INR, PTT











INR  1.00  (0.82-1.09)   01/12/18  10:20    














Problem List





- Problems


(1) Acute metabolic encephalopathy


Assessment/Plan: 


Due to dehydration and severe Hyponatremia, gradually improving.


Code(s): G93.41 - METABOLIC ENCEPHALOPATHY   





(2) Acute hyponatremia


Assessment/Plan: 


Severe Hyponatremia with Dehydration , on IV NS   Sr Na 131.


Code(s): E87.1 - HYPO-OSMOLALITY AND HYPONATREMIA   





(3) Acute hypokalemia


Assessment/Plan: 


 K 3.3 Repletetd F/U K and Mag level.








(4) Hypothyroidism


Assessment/Plan: 


F/U TSH on Levothyroxine


Code(s): E03.9 - HYPOTHYROIDISM, UNSPECIFIED   





(5) Fracture of left hip


Assessment/Plan: 


Opted for non operative treatment, pain control, no Wt bearing.


Code(s): S72.002A - FRACTURE OF UNSP PART OF NECK OF LEFT FEMUR, INIT   


Qualifiers: 


   Encounter type: subsequent encounter   Fracture type: closed 





(6) Anemia


Assessment/Plan: 


Chronic h/h stable


Code(s): D64.9 - ANEMIA, UNSPECIFIED   


Qualifiers: 


   Anemia type: unspecified type   Qualified Code(s): D64.9 - Anemia, 

unspecified   





(7) Diarrhea


Assessment/Plan: 


F/U stool W/U including C Diff


Code(s): R19.7 - DIARRHEA, UNSPECIFIED   





(8) SKIP (acute kidney injury)


Assessment/Plan: 


Improving with IV  hydration


Code(s): N17.9 - ACUTE KIDNEY FAILURE, UNSPECIFIED

## 2018-01-15 NOTE — PN
Progress Note, Physician


History of Present Illness: 





stable


no new isues


c/o of dirdeepak newmanff has been send





- Current Medication List


Current Medications: 


Active Medications





Acetaminophen (Ofirmev Injection -)  600 mg IVPB Q6H PRN


   PRN Reason: PAIN LEVEL 1-5


   Last Admin: 01/14/18 22:10 Dose:  600 mg


Heparin Sodium (Porcine) (Heparin -)  5,000 unit SQ TID [facility]


   Last Admin: 01/15/18 06:23 Dose:  5,000 unit


Piperacillin/Tazobactam/Dextrose (Zosyn 2.25gm Ivpb (Premix))  2.25 gm in 50 

mls @ 100 mls/hr IVPB Q8H-IV [facility]


   PRN Reason: Protocol


   Last Admin: 01/15/18 10:10 Dose:  100 mls/hr


Sodium Chloride (Normal Saline -)  1,000 mls @ 83 mls/hr IV ASDIR [facility]


Potassium Chloride (Potassium Chloride Oral Liquid)  40 meq PO BID [facility]











- Objective


Vital Signs: 


 Vital Signs











Temperature  97.9 F   01/15/18 08:20


 


Pulse Rate  77   01/15/18 08:20


 


Respiratory Rate  18   01/15/18 08:20


 


Blood Pressure  107/71   01/15/18 08:20


 


O2 Sat by Pulse Oximetry (%)  100   01/14/18 21:00











Constitutional: Yes: No Distress, Calm


Cardiovascular: Yes: S1, S2


Respiratory: Yes: Regular, CTA Bilaterally


Gastrointestinal: Yes: Normal Bowel Sounds, Soft


Musculoskeletal: Yes: WNL


Extremities: Yes: WNL


Neurological: Yes: Alert, Oriented


Psychiatric: Yes: Alert, Oriented


Labs: 


 CBC, BMP





 01/15/18 05:25 





 01/15/18 05:25 





 INR, PTT











INR  1.00  (0.82-1.09)   01/12/18  10:20    














Assessment/Plan





Problem List





- Problems


(1) Acute hyponatremia


Code(s): E87.1 - HYPO-OSMOLALITY AND HYPONATREMIA   





(2) Acute metabolic encephalopathy


Code(s): G93.41 - METABOLIC ENCEPHALOPATHY   





(3) Acute hypokalemia


Code(s): E87.6 - HYPOKALEMIA   





(4) Acute kidney failurey


Code(s): N17.9 - ACUTE KIDNEY FAILURE, UNSPECIFIED   





(5) Diarrhea


Code(s): R19.7 - DIARRHEA, UNSPECIFIED   





(6) Hypotension


Code(s): I95.9 - HYPOTENSION, UNSPECIFIED   





(7) Fall


Code(s): W19.XXXA - UNSPECIFIED FALL, INITIAL ENCOUNTER   





(8) Hypothyroidism 


Code(s): E03.9 - HYPOTHYROIDISM, UNSPECIFIED   





(9) Anemia


Code(s): D64.9 - ANEMIA, UNSPECIFIED   


Qualifiers: 


   Anemia type: unspecified type   Qualified Code(s): D64.9 - Anemia, 

unspecified   





(10) Fracture of left hip


Code(s): S72.002A - FRACTURE OF UNSP PART OF NECK OF LEFT FEMUR, INIT   


Qualifiers: 


   Encounter type: subsequent encounter   Fracture type: closed 





11 sepsis








plan


will stop abx


all cx negative


await for cdiff result


rest as per primary team

## 2018-01-15 NOTE — PN
Progress Note (short form)





- Note


Progress Note: 





Renal follow up for Severe Hyponatremia and renal failure





Pt seen and examined at the bedside


awake and alert


having diarrhea


no confusion, lethargy, weakness


no N/V


 


 


 Vital Signs











Temperature  -97.7 F L  01/15/18 06:00


 


Pulse Rate  73   01/15/18 06:00


 


Respiratory Rate  18   01/15/18 06:00


 


Blood Pressure  105/65   01/15/18 06:00


 


O2 Sat by Pulse Oximetry (%)  100   01/14/18 21:00








 Intake & Output











 01/12/18 01/13/18 01/14/18 01/15/18





 23:59 23:59 23:59 23:59


 


Intake Total 800 1770 960 700


 


Output Total 700 1300 1800 200


 


Balance 100 470 -840 500


 


Weight 38.697 kg 40.098 kg 39.735 kg 37.875 kg











NAD


awake and alert


Dry MM


RRR


CTA


soft NT/ND


No LE edema





 


 CBC, BMP





 01/15/18 05:25 





 01/15/18 05:25 





 Current Medications





Acetaminophen (Ofirmev Injection -)  600 mg IVPB Q6H PRN


   PRN Reason: PAIN LEVEL 1-5


   Last Admin: 01/14/18 22:10 Dose:  600 mg


Heparin Sodium (Porcine) (Heparin -)  5,000 unit SQ TID [facility]


   Last Admin: 01/15/18 06:23 Dose:  5,000 unit


Piperacillin/Tazobactam/Dextrose (Zosyn 2.25gm Ivpb (Premix))  2.25 gm in 50 

mls @ 100 mls/hr IVPB Q8H-IV [facility]


   PRN Reason: Protocol


   Last Admin: 01/15/18 10:10 Dose:  100 mls/hr


Sodium Chloride (Normal Saline -)  1,000 mls @ 83 mls/hr IV ASDIR [facility]


Potassium Chloride (Potassium Chloride Oral Liquid)  40 meq PO BID [facility]











72 year old woman with PMhx of CKD (Hx of SKIP, Hydronephrotic right Kidney), 

Dementia, Hypertension who presented from NH with syncope and AMS as inpatient 

and found to have hyponatremia with serum Na of 109 and SKIP.





#Acute Symptomatic Hypovolemic Hyponatremia likely due to volume loss of 

diarrhea


Serum Na improving with isotonic saline 


will increase NS rate to 83cc per hour 





#Acute Renal Failure with preserved tubular function


improving with IVF


pt continues to have diarrhea 


increase fluid rate today





#Metabolic acidosis in setting of diarrhea


start sodium bicarb 650mg BID





#Hypokalemia


start kcl 40meq Po BID








Thank you 


Dustin An DO

## 2018-01-16 LAB
ANION GAP SERPL CALC-SCNC: 8 MMOL/L (ref 8–16)
BASOPHILS # BLD: 1 % (ref 0–2)
BUN SERPL-MCNC: 31 MG/DL (ref 7–18)
CALCIUM SERPL-MCNC: 8 MG/DL (ref 8.5–10.1)
CHLORIDE SERPL-SCNC: 106 MMOL/L (ref 98–107)
CO2 SERPL-SCNC: 20 MMOL/L (ref 21–32)
CREAT SERPL-MCNC: 0.7 MG/DL (ref 0.55–1.02)
DEPRECATED RDW RBC AUTO: 16.2 % (ref 11.6–15.6)
EOSINOPHIL # BLD: 2 % (ref 0–4.5)
GLUCOSE SERPL-MCNC: 82 MG/DL (ref 74–106)
HCT VFR BLD CALC: 32 % (ref 32.4–45.2)
HGB BLD-MCNC: 10.2 GM/DL (ref 10.7–15.3)
LYMPHOCYTES # BLD: 25.8 % (ref 8–40)
MAGNESIUM SERPL-MCNC: 2.2 MG/DL (ref 1.8–2.4)
MCH RBC QN AUTO: 25.4 PG (ref 25.7–33.7)
MCHC RBC AUTO-ENTMCNC: 32 G/DL (ref 32–36)
MCV RBC: 79.4 FL (ref 80–96)
MONOCYTES # BLD AUTO: 7.9 % (ref 3.8–10.2)
NEUTROPHILS # BLD: 63.3 % (ref 42.8–82.8)
PHOSPHATE SERPL-MCNC: 2.1 MG/DL (ref 2.5–4.9)
PLATELET # BLD AUTO: 325 K/MM3 (ref 134–434)
PMV BLD: 9 FL (ref 7.5–11.1)
POTASSIUM SERPLBLD-SCNC: 4.2 MMOL/L (ref 3.5–5.1)
RBC # BLD AUTO: 4.03 M/MM3 (ref 3.6–5.2)
SODIUM SERPL-SCNC: 134 MMOL/L (ref 136–145)
WBC # BLD AUTO: 8.9 K/MM3 (ref 4–10)

## 2018-01-16 RX ADMIN — FERROUS SULFATE TAB EC 324 MG (65 MG FE EQUIVALENT) SCH MG: 324 (65 FE) TABLET DELAYED RESPONSE at 18:25

## 2018-01-16 RX ADMIN — HEPARIN SODIUM SCH UNIT: 5000 INJECTION, SOLUTION INTRAVENOUS; SUBCUTANEOUS at 15:08

## 2018-01-16 RX ADMIN — OXYCODONE HYDROCHLORIDE AND ACETAMINOPHEN SCH MG: 500 TABLET ORAL at 15:08

## 2018-01-16 RX ADMIN — VANCOMYCIN HYDROCHLORIDE SCH MG: 250 CAPSULE ORAL at 18:12

## 2018-01-16 RX ADMIN — SODIUM CHLORIDE SCH MLS/HR: 9 INJECTION, SOLUTION INTRAVENOUS at 06:54

## 2018-01-16 RX ADMIN — VANCOMYCIN HYDROCHLORIDE SCH MG: 250 CAPSULE ORAL at 12:22

## 2018-01-16 RX ADMIN — HEPARIN SODIUM SCH UNIT: 5000 INJECTION, SOLUTION INTRAVENOUS; SUBCUTANEOUS at 06:52

## 2018-01-16 RX ADMIN — VANCOMYCIN HYDROCHLORIDE SCH MG: 250 CAPSULE ORAL at 06:52

## 2018-01-16 RX ADMIN — POTASSIUM CHLORIDE SCH MEQ: 20 SOLUTION ORAL at 21:51

## 2018-01-16 RX ADMIN — OXYCODONE HYDROCHLORIDE AND ACETAMINOPHEN SCH MG: 500 TABLET ORAL at 21:51

## 2018-01-16 RX ADMIN — POTASSIUM CHLORIDE SCH MEQ: 20 SOLUTION ORAL at 10:27

## 2018-01-16 RX ADMIN — POTASSIUM & SODIUM PHOSPHATES POWDER PACK 280-160-250 MG SCH PACKET: 280-160-250 PACK at 21:51

## 2018-01-16 RX ADMIN — HEPARIN SODIUM SCH UNIT: 5000 INJECTION, SOLUTION INTRAVENOUS; SUBCUTANEOUS at 21:51

## 2018-01-16 RX ADMIN — VANCOMYCIN HYDROCHLORIDE SCH MG: 250 CAPSULE ORAL at 01:02

## 2018-01-16 RX ADMIN — POTASSIUM & SODIUM PHOSPHATES POWDER PACK 280-160-250 MG SCH PACKET: 280-160-250 PACK at 18:12

## 2018-01-16 NOTE — PN
Progress Note, Physician


Chief Complaint: 


Pt laying in bed, in no acute distress. Pt reports she feels fine. Pt denies 

any chest pain, sob, n/v, or unilateral weakness. 





- Current Medication List


Current Medications: 


Active Medications





Acetaminophen (Ofirmev Injection -)  600 mg IVPB Q6H PRN


   PRN Reason: PAIN LEVEL 1-5


   Last Admin: 01/14/18 22:10 Dose:  600 mg


Heparin Sodium (Porcine) (Heparin -)  5,000 unit SQ TID [facility]


   Last Admin: 01/16/18 06:52 Dose:  5,000 unit


Sodium Chloride (Normal Saline -)  1,000 mls @ 83 mls/hr IV ASDIR [facility]


   Last Admin: 01/16/18 06:54 Dose:  83 mls/hr


Potassium Chloride (Potassium Chloride Oral Liquid)  40 meq PO BID [facility]


   Last Admin: 01/16/18 10:27 Dose:  40 meq


Vancomycin HCl (Vancomycin Oral Solution)  125 mg PO Q6HPO [facility]


   Last Admin: 01/16/18 12:22 Dose:  125 mg











- Objective


Vital Signs: 


 Vital Signs











Temperature  97.4 F L  01/16/18 06:00


 


Pulse Rate  76   01/16/18 06:00


 


Respiratory Rate  18   01/16/18 06:00


 


Blood Pressure  96/65   01/16/18 06:00


 


O2 Sat by Pulse Oximetry (%)  99   01/15/18 21:00











Constitutional: Yes: No Distress, Calm


Cardiovascular: Yes: WNL, Regular Rate and Rhythm


Respiratory: Yes: WNL, Regular, Rales (bibasilar)


Gastrointestinal: Yes: WNL, Normal Bowel Sounds, Soft


Extremities: Yes: WNL


Edema: No


Integumentary: Yes: WNL


Neurological: Yes: WNL


Psychiatric: Yes: WNL


Labs: 


 CBC, BMP





 01/16/18 06:00 





 01/16/18 06:00 





 INR, PTT











INR  1.00  (0.82-1.09)   01/12/18  10:20    














Problem List





- Problems


(1) Acute hyponatremia


Code(s): E87.1 - HYPO-OSMOLALITY AND HYPONATREMIA   





(2) Acute metabolic encephalopathy


Code(s): G93.41 - METABOLIC ENCEPHALOPATHY   





(3) Acute hypokalemia


Code(s): E87.6 - HYPOKALEMIA   





(4) Acute kidney failure


Code(s): N17.9 - ACUTE KIDNEY FAILURE, UNSPECIFIED   





(5) Diarrhea


Code(s): R19.7 - DIARRHEA, UNSPECIFIED   





(6) Hypotension


Code(s): I95.9 - HYPOTENSION, UNSPECIFIED   





(7) Fall


Code(s): W19.XXXA - UNSPECIFIED FALL, INITIAL ENCOUNTER   





(8) Hypothyroidism


Code(s): E03.9 - HYPOTHYROIDISM, UNSPECIFIED   





(9) Anemia


Code(s): D64.9 - ANEMIA, UNSPECIFIED   


Qualifiers: 


   Anemia type: unspecified type   Qualified Code(s): D64.9 - Anemia, 

unspecified   





(10) Fracture of left hip


Code(s): S72.002A - FRACTURE OF UNSP PART OF NECK OF LEFT FEMUR, INIT   


Qualifiers: 


   Encounter type: subsequent encounter   Fracture type: closed 





Assessment/Plan


(1) Acute hyponatremia


Assessment/Plan: 


Improving


Nephrology managed


Continue NS @83mL/hr


 


Code(s): E87.1 - HYPO-OSMOLALITY AND HYPONATREMIA   





(2) Acute metabolic encephalopathy


Assessment/Plan: 


Resolved


Code(s): G93.41 - METABOLIC ENCEPHALOPATHY   





(3) Acute hypokalemia


Assessment/Plan: 


Resolved, currently on potassium chloride 40meq po BID per Nephrology 


Code(s): E87.6 - HYPOKALEMIA   





(4) Acute kidney failure


Assessment/Plan: 


Improved


Code(s): N17.9 - ACUTE KIDNEY FAILURE, UNSPECIFIED   





(5) Diarrhea


Assessment/Plan: 


Cdiff antigen positive, toxin negative


On PO Vanco per ID 


Code(s): R19.7 - DIARRHEA, UNSPECIFIED   





(6) Hypotension


Assessment/Plan: 


Improving 


Continue IVF hydration 


Code(s): I95.9 - HYPOTENSION, UNSPECIFIED   





(7) Fall


Assessment/Plan: 


s/p fall at [facility], xrays, head CT negative, chronic fracture of left hip(

unchanged from previous CT)


PT eval pending 


Code(s): W19.XXXA - UNSPECIFIED FALL, INITIAL ENCOUNTER   





(8) Hypothyroidism


Assessment/Plan: 


Levothyroxine restarted


Code(s): E03.9 - HYPOTHYROIDISM, UNSPECIFIED   





(9) Anemia


Assessment/Plan: 


hg/hct stable


Iron restarted 


will continue to monitor


Code(s): D64.9 - ANEMIA, UNSPECIFIED   


Qualifiers: 


   Anemia type: unspecified type   Qualified Code(s): D64.9 - Anemia, 

unspecified   





(10) Fracture of left hip


Assessment/Plan: 


unchanged from previous CT in November 


Pt had refused surgery


Code(s): S72.002A - FRACTURE OF UNSP PART OF NECK OF LEFT FEMUR, INIT   


Qualifiers: 


   Encounter type: subsequent encounter   Fracture type: closed

## 2018-01-16 NOTE — PN
Progress Note (short form)





- Note


Progress Note: 





Renal follow up for Severe Hyponatremia and renal failure





Pt seen and examined at the bedside


awake and alert


continues to have diarrhea


no sob, chest pain, N/V/D


 


 


 Vital Signs











Temperature  97 F L  01/16/18 10:00


 


Pulse Rate  78   01/16/18 10:00


 


Respiratory Rate  18   01/16/18 10:00


 


Blood Pressure  102/58   01/16/18 10:00


 


O2 Sat by Pulse Oximetry (%)  99   01/16/18 09:00








 Intake & Output











 01/13/18 01/14/18 01/15/18 01/16/18





 23:59 23:59 23:59 23:59


 


Intake Total 1621 687 2582 1812


 


Output Total 1300 1800 525 200


 


Balance 470 -840 1889 1612


 


Weight 40.098 kg 39.735 kg 37.875 kg 37.79 kg














NAD


awake and alert


Dry MM


RRR


CTA


soft NT/ND


No LE edema





 


 


 CBC, BMP





 01/16/18 06:00 





 01/16/18 06:00 


 Laboratory Tests











  01/16/18





  06:00


 


Phosphorus  2.1 L D


 


Magnesium  2.2  D











 Current Medications





Acetaminophen (Ofirmev Injection -)  600 mg IVPB Q6H PRN


   PRN Reason: PAIN LEVEL 1-5


   Last Admin: 01/14/18 22:10 Dose:  600 mg


Ascorbic Acid (Vitamin C -)  500 mg PO TID [facility]


   Last Admin: 01/16/18 15:08 Dose:  500 mg


Aspirin (Ecotrin -)  81 mg PO DAILY [facility]


Heparin Sodium (Porcine) (Heparin -)  5,000 unit SQ TID [facility]


   Last Admin: 01/16/18 15:08 Dose:  5,000 unit


Sodium Chloride (Normal Saline -)  1,000 mls @ 83 mls/hr IV ASDIR [facility]


   Last Admin: 01/16/18 06:54 Dose:  83 mls/hr


Levothyroxine Sodium (Synthroid -)  150 mcg PO DAILY@0700 [facility]


Non-Formulary Medication (Ferrous Sulfate [Feosol])  325 mg PO TIDCM [facility]


Potassium Chloride (Potassium Chloride Oral Liquid)  40 meq PO BID [facility]


   Last Admin: 01/16/18 10:27 Dose:  40 meq


Vancomycin HCl (Vancomycin Oral Solution)  125 mg PO Q6HPO [facility]


   Last Admin: 01/16/18 12:22 Dose:  125 mg














72 year old woman with PMhx of CKD (Hx of SKIP, Hydronephrotic right Kidney), 

Dementia, Hypertension who presented from NH with syncope and AMS as inpatient 

and found to have hyponatremia with serum Na of 109 and SKIP.





#Acute Symptomatic Hypovolemic Hyponatremia likely due to volume loss of 

diarrhea


Serum Na now improved to near normal


continue isotonic saline as pt continues to ahve diarrhea 





#Acute Renal Failure with preserved tubular function


significantly improved


continue isotonic saline for now





#Metabolic acidosis in setting of diarrhea


continue oral bicarb





#Hypokalemia


continue kcl 40meq Po BID





#Hypophosphatemia


start oral neutraphos TID








Thank you 


Dustin An DO

## 2018-01-16 NOTE — PN
Progress Note, Physician


History of Present Illness: 





stable


having dirrhoea still





- Current Medication List


Current Medications: 


Active Medications





Acetaminophen (Ofirmev Injection -)  600 mg IVPB Q6H PRN


   PRN Reason: PAIN LEVEL 1-5


   Last Admin: 01/14/18 22:10 Dose:  600 mg


Heparin Sodium (Porcine) (Heparin -)  5,000 unit SQ TID [facility]


   Last Admin: 01/16/18 06:52 Dose:  5,000 unit


Sodium Chloride (Normal Saline -)  1,000 mls @ 83 mls/hr IV ASDIR [facility]


   Last Admin: 01/16/18 06:54 Dose:  83 mls/hr


Potassium Chloride (Potassium Chloride Oral Liquid)  40 meq PO BID [facility]


   Last Admin: 01/16/18 10:27 Dose:  40 meq


Vancomycin HCl (Vancomycin Oral Solution)  125 mg PO Q6HPO [facility]


   Last Admin: 01/16/18 12:22 Dose:  125 mg











- Objective


Vital Signs: 


 Vital Signs











Temperature  97.4 F L  01/16/18 06:00


 


Pulse Rate  76   01/16/18 06:00


 


Respiratory Rate  18   01/16/18 06:00


 


Blood Pressure  96/65   01/16/18 06:00


 


O2 Sat by Pulse Oximetry (%)  99   01/15/18 21:00











Constitutional: Yes: Calm, Mild Distress, Thin


Cardiovascular: Yes: Regular Rate and Rhythm


Respiratory: Yes: Regular, CTA Bilaterally


Gastrointestinal: Yes: Normal Bowel Sounds, Soft


Musculoskeletal: Yes: WNL


Extremities: Yes: WNL


Neurological: Yes: Alert, Oriented


Psychiatric: Yes: Alert, Oriented


Labs: 


 CBC, BMP





 01/16/18 06:00 





 01/16/18 06:00 





 INR, PTT











INR  1.00  (0.82-1.09)   01/12/18  10:20    














Assessment/Plan





Problem List





- Problems


(1) Acute hyponatremia


Code(s): E87.1 - HYPO-OSMOLALITY AND HYPONATREMIA   





(2) Acute metabolic encephalopathy


Code(s): G93.41 - METABOLIC ENCEPHALOPATHY   





(3) Acute hypokalemia


Code(s): E87.6 - HYPOKALEMIA   





(4) Acute kidney failurey


Code(s): N17.9 - ACUTE KIDNEY FAILURE, UNSPECIFIED   





(5) Diarrhea


Code(s): R19.7 - DIARRHEA, UNSPECIFIED   





(6) Hypotension


Code(s): I95.9 - HYPOTENSION, UNSPECIFIED   





(7) Fall


Code(s): W19.XXXA - UNSPECIFIED FALL, INITIAL ENCOUNTER   





(8) Hypothyroidism 


Code(s): E03.9 - HYPOTHYROIDISM, UNSPECIFIED   





(9) Anemia


Code(s): D64.9 - ANEMIA, UNSPECIFIED   


Qualifiers: 


   Anemia type: unspecified type   Qualified Code(s): D64.9 - Anemia, 

unspecified   





(10) Fracture of left hip


Code(s): S72.002A - FRACTURE OF UNSP PART OF NECK OF LEFT FEMUR, INIT   


Qualifiers: 


   Encounter type: subsequent encounter   Fracture type: closed 





11 sepsis








plan


continue oral vanco


continue monitoring


replace


rest as per primary

## 2018-01-17 LAB
ANION GAP SERPL CALC-SCNC: 9 MMOL/L (ref 8–16)
BASOPHILS # BLD: 0.8 % (ref 0–2)
BUN SERPL-MCNC: 18 MG/DL (ref 7–18)
CALCIUM SERPL-MCNC: 8.3 MG/DL (ref 8.5–10.1)
CHLORIDE SERPL-SCNC: 106 MMOL/L (ref 98–107)
CO2 SERPL-SCNC: 20 MMOL/L (ref 21–32)
CREAT SERPL-MCNC: 0.6 MG/DL (ref 0.55–1.02)
DEPRECATED RDW RBC AUTO: 16.1 % (ref 11.6–15.6)
EOSINOPHIL # BLD: 1.9 % (ref 0–4.5)
GLUCOSE SERPL-MCNC: 76 MG/DL (ref 74–106)
HCT VFR BLD CALC: 30.9 % (ref 32.4–45.2)
HGB BLD-MCNC: 10 GM/DL (ref 10.7–15.3)
LYMPHOCYTES # BLD: 22.8 % (ref 8–40)
MAGNESIUM SERPL-MCNC: 1.9 MG/DL (ref 1.8–2.4)
MCH RBC QN AUTO: 25.7 PG (ref 25.7–33.7)
MCHC RBC AUTO-ENTMCNC: 32.3 G/DL (ref 32–36)
MCV RBC: 79.5 FL (ref 80–96)
MONOCYTES # BLD AUTO: 6 % (ref 3.8–10.2)
NEUTROPHILS # BLD: 68.5 % (ref 42.8–82.8)
PHOSPHATE SERPL-MCNC: 2.5 MG/DL (ref 2.5–4.9)
PLATELET # BLD AUTO: 330 K/MM3 (ref 134–434)
PMV BLD: 8.7 FL (ref 7.5–11.1)
POTASSIUM SERPLBLD-SCNC: 4.9 MMOL/L (ref 3.5–5.1)
RBC # BLD AUTO: 3.88 M/MM3 (ref 3.6–5.2)
SODIUM SERPL-SCNC: 135 MMOL/L (ref 136–145)
WBC # BLD AUTO: 10.3 K/MM3 (ref 4–10)

## 2018-01-17 RX ADMIN — POTASSIUM CHLORIDE SCH MEQ: 20 SOLUTION ORAL at 10:19

## 2018-01-17 RX ADMIN — VANCOMYCIN HYDROCHLORIDE SCH MG: 250 CAPSULE ORAL at 00:08

## 2018-01-17 RX ADMIN — OXYCODONE HYDROCHLORIDE AND ACETAMINOPHEN SCH MG: 500 TABLET ORAL at 21:30

## 2018-01-17 RX ADMIN — LEVOTHYROXINE SODIUM SCH MCG: 100 TABLET ORAL at 06:05

## 2018-01-17 RX ADMIN — POTASSIUM & SODIUM PHOSPHATES POWDER PACK 280-160-250 MG SCH PACKET: 280-160-250 PACK at 06:05

## 2018-01-17 RX ADMIN — VANCOMYCIN HYDROCHLORIDE SCH MG: 250 CAPSULE ORAL at 06:00

## 2018-01-17 RX ADMIN — FERROUS SULFATE TAB EC 324 MG (65 MG FE EQUIVALENT) SCH MG: 324 (65 FE) TABLET DELAYED RESPONSE at 09:28

## 2018-01-17 RX ADMIN — SODIUM CHLORIDE SCH: 9 INJECTION, SOLUTION INTRAVENOUS at 17:41

## 2018-01-17 RX ADMIN — VANCOMYCIN HYDROCHLORIDE SCH MG: 250 CAPSULE ORAL at 17:41

## 2018-01-17 RX ADMIN — POTASSIUM & SODIUM PHOSPHATES POWDER PACK 280-160-250 MG SCH PACKET: 280-160-250 PACK at 21:30

## 2018-01-17 RX ADMIN — OXYCODONE HYDROCHLORIDE AND ACETAMINOPHEN SCH MG: 500 TABLET ORAL at 06:04

## 2018-01-17 RX ADMIN — FERROUS SULFATE TAB EC 324 MG (65 MG FE EQUIVALENT) SCH MG: 324 (65 FE) TABLET DELAYED RESPONSE at 13:03

## 2018-01-17 RX ADMIN — FERROUS SULFATE TAB EC 324 MG (65 MG FE EQUIVALENT) SCH MG: 324 (65 FE) TABLET DELAYED RESPONSE at 17:41

## 2018-01-17 RX ADMIN — POTASSIUM & SODIUM PHOSPHATES POWDER PACK 280-160-250 MG SCH PACKET: 280-160-250 PACK at 14:28

## 2018-01-17 RX ADMIN — HEPARIN SODIUM SCH UNIT: 5000 INJECTION, SOLUTION INTRAVENOUS; SUBCUTANEOUS at 06:00

## 2018-01-17 RX ADMIN — VANCOMYCIN HYDROCHLORIDE SCH MG: 250 CAPSULE ORAL at 13:02

## 2018-01-17 RX ADMIN — ASPIRIN SCH MG: 81 TABLET, COATED ORAL at 10:19

## 2018-01-17 RX ADMIN — OXYCODONE HYDROCHLORIDE AND ACETAMINOPHEN SCH MG: 500 TABLET ORAL at 14:28

## 2018-01-17 RX ADMIN — HEPARIN SODIUM SCH UNIT: 5000 INJECTION, SOLUTION INTRAVENOUS; SUBCUTANEOUS at 21:31

## 2018-01-17 RX ADMIN — HEPARIN SODIUM SCH UNIT: 5000 INJECTION, SOLUTION INTRAVENOUS; SUBCUTANEOUS at 14:28

## 2018-01-17 RX ADMIN — VANCOMYCIN HYDROCHLORIDE SCH MG: 250 CAPSULE ORAL at 23:20

## 2018-01-17 RX ADMIN — SODIUM CHLORIDE SCH MLS/HR: 9 INJECTION, SOLUTION INTRAVENOUS at 14:12

## 2018-01-17 NOTE — PN
Progress Note (short form)





- Note


Progress Note: 





Renal follow up for Severe Hyponatremia and renal failure





Pt seen and examined at the bedside


awake and alert 


continues to have diarrhea


no sob, chest pain


on IVF


 


 


 


 Vital Signs











Temperature  97.8 F   01/17/18 10:00


 


Pulse Rate  78   01/17/18 10:00


 


Respiratory Rate  18   01/17/18 10:00


 


Blood Pressure  100/61   01/17/18 10:00


 


O2 Sat by Pulse Oximetry (%)  100   01/16/18 21:00








 Intake & Output











 01/14/18 01/15/18 01/16/18 01/17/18





 23:59 23:59 23:59 23:59


 


Intake Total 960 2414 2112 1370


 


Output Total 1800 525 200 


 


Balance -840 1889 1912 1370


 


Weight 39.735 kg 37.875 kg 37.79 kg 39.519 kg











NAD


awake and alert


Dry MM


RRR


CTA


soft NT/ND


No LE edema





 


 


 


 CBC, BMP





 01/17/18 05:40 





 01/17/18 05:40 


 Laboratory Tests











  01/17/18





  05:40


 


Calcium  8.3 L


 


Phosphorus  2.5


 


Magnesium  1.9











 Current Medications





Acetaminophen (Tylenol -)  650 mg PO Q4H PRN


   PRN Reason: PAIN OR FEVER


Ascorbic Acid (Vitamin C -)  500 mg PO TID [facility]


   Last Admin: 01/17/18 06:04 Dose:  500 mg


Aspirin (Ecotrin -)  81 mg PO DAILY [facility]


   Last Admin: 01/17/18 10:19 Dose:  81 mg


Ferrous Sulfate (Feosol -)  325 mg PO TIDCM [facility]


   Last Admin: 01/17/18 13:03 Dose:  325 mg


Heparin Sodium (Porcine) (Heparin -)  5,000 unit SQ TID [facility]


   Last Admin: 01/17/18 06:00 Dose:  5,000 unit


Sodium Chloride (Normal Saline -)  1,000 mls @ 83 mls/hr IV ASDIR [facility]


   Last Admin: 01/17/18 14:12 Dose:  83 mls/hr


Levothyroxine Sodium 100 mcg/ (Levothyroxine Sodium 50 mcg)  150 mcg PO DAILY@

0700 [facility]


   Last Admin: 01/17/18 06:05 Dose:  150 mcg


Potassium Chloride (Potassium Chloride Oral Liquid)  40 meq PO DAILY [facility]


Potassium Phos/Sodium Phos (Phos-Nak Packet -)  1 packet PO TID [facility]


   Last Admin: 01/17/18 06:05 Dose:  1 packet


Tramadol HCl (Ultram -)  50 mg PO Q8H PRN


   PRN Reason: PAIN LEVEL 7 - 10


Vancomycin HCl (Vancomycin Oral Solution)  125 mg PO Q6HPO [facility]


   Last Admin: 01/17/18 13:02 Dose:  125 mg














72 year old woman with PMhx of CKD (Hx of SKIP, Hydronephrotic right Kidney), 

Dementia, Hypertension who presented from NH with syncope and AMS as inpatient 

and found to have hyponatremia with serum Na of 109 and SKIP.





#Acute Symptomatic Hypovolemic Hyponatremia likely due to volume loss of 

diarrhea


Serum Na now improved and stable


continue isotonic saline as pt continues to have diarrhea





#Acute Renal Failure with preserved tubular function


significantly improved


continue isotonic saline for now





#Metabolic acidosis in setting of diarrhea


continue oral bicarb





#Hypokalemia


will decrease KCL to once daily as K is 4.9





#Hypophosphatemia


continue neutraphos TID








Thank you 


Dustin An DO

## 2018-01-17 NOTE — PN
Progress Note, Physician


Chief Complaint: 


Pt laying in bed, in no acute distress. Pt reports she feels okay other than 

the "thigh pain", pt reports 8/10 chronic aching left hip pain. Pt continues to 

have diarrhea. Pt denies any chest pain, sob, n/v, or unilateral weakness. 





- Current Medication List


Current Medications: 


Active Medications





Acetaminophen (Ofirmev Injection -)  600 mg IVPB Q6H PRN


   PRN Reason: PAIN LEVEL 1-5


   Last Admin: 01/14/18 22:10 Dose:  600 mg


Ascorbic Acid (Vitamin C -)  500 mg PO TID [facility]


   Last Admin: 01/17/18 06:04 Dose:  500 mg


Aspirin (Ecotrin -)  81 mg PO DAILY [facility]


Ferrous Sulfate (Feosol -)  325 mg PO TIDCM [facility]


   Last Admin: 01/17/18 09:28 Dose:  325 mg


Heparin Sodium (Porcine) (Heparin -)  5,000 unit SQ TID [facility]


   Last Admin: 01/17/18 06:00 Dose:  5,000 unit


Sodium Chloride (Normal Saline -)  1,000 mls @ 83 mls/hr IV ASDIR [facility]


   Last Admin: 01/16/18 06:54 Dose:  83 mls/hr


Levothyroxine Sodium 100 mcg/ (Levothyroxine Sodium 50 mcg)  150 mcg PO DAILY@

0700 [facility]


   Last Admin: 01/17/18 06:05 Dose:  150 mcg


Potassium Chloride (Potassium Chloride Oral Liquid)  40 meq PO BID [facility]


   Last Admin: 01/16/18 21:51 Dose:  40 meq


Potassium Phos/Sodium Phos (Phos-Nak Packet -)  1 packet PO TID [facility]


   Last Admin: 01/17/18 06:05 Dose:  1 packet


Vancomycin HCl (Vancomycin Oral Solution)  125 mg PO Q6HPO [facility]


   Last Admin: 01/17/18 06:00 Dose:  125 mg











- Objective


Vital Signs: 


 Vital Signs











Temperature  97.8 F   01/17/18 05:51


 


Pulse Rate  78   01/17/18 05:51


 


Respiratory Rate  18   01/17/18 05:51


 


Blood Pressure  101/62   01/17/18 05:51


 


O2 Sat by Pulse Oximetry (%)  100   01/16/18 21:00











Constitutional: Yes: No Distress, Calm


Cardiovascular: Yes: WNL


Respiratory: Yes: WNL, CTA Bilaterally


Gastrointestinal: Yes: WNL, Normal Bowel Sounds, Soft


Extremities: Yes: WNL


Edema: No


Neurological: Yes: Alert, Oriented


Psychiatric: Yes: WNL, Alert


Labs: 


 CBC, BMP





 01/17/18 05:40 





 01/17/18 05:40 





 INR, PTT











INR  1.00  (0.82-1.09)   01/12/18  10:20    














Problem List





- Problems


(1) Acute hyponatremia


Code(s): E87.1 - HYPO-OSMOLALITY AND HYPONATREMIA   





(2) Acute metabolic encephalopathy


Code(s): G93.41 - METABOLIC ENCEPHALOPATHY   





(3) Acute hypokalemia


Code(s): E87.6 - HYPOKALEMIA   





(4) Acute kidney failure


Code(s): N17.9 - ACUTE KIDNEY FAILURE, UNSPECIFIED   





(5) Diarrhea


Code(s): R19.7 - DIARRHEA, UNSPECIFIED   





(6) Hypotension


Code(s): I95.9 - HYPOTENSION, UNSPECIFIED   





(7) Fall


Code(s): W19.XXXA - UNSPECIFIED FALL, INITIAL ENCOUNTER   





(8) Hypothyroidism


Code(s): E03.9 - HYPOTHYROIDISM, UNSPECIFIED   





(9) Anemia


Code(s): D64.9 - ANEMIA, UNSPECIFIED   


Qualifiers: 


   Anemia type: unspecified type   Qualified Code(s): D64.9 - Anemia, 

unspecified   





(10) Fracture of left hip


Code(s): S72.002A - FRACTURE OF UNSP PART OF NECK OF LEFT FEMUR, INIT   


Qualifiers: 


   Encounter type: subsequent encounter   Fracture type: closed 





(11) Dementia


Code(s): F03.90 - UNSPECIFIED DEMENTIA WITHOUT BEHAVIORAL DISTURBANCE   





(12) Chronic left hip pain


Code(s): M25.552 - PAIN IN LEFT HIP; G89.29 - OTHER CHRONIC PAIN   





Assessment/Plan


(1) Acute hyponatremia


Assessment/Plan: 


Improving


Nephrology managed


 Code(s): E87.1 - HYPO-OSMOLALITY AND HYPONATREMIA   





(2) Acute metabolic encephalopathy


Assessment/Plan: 


Resolved. pt back at baseline


Code(s): G93.41 - METABOLIC ENCEPHALOPATHY   





(3) Acute hypokalemia


Assessment/Plan: 


Resolved, potassium chloride 40meq po changed to daily per Nephrology 


Continue to monitor labs 


Code(s): E87.6 - HYPOKALEMIA   





(4) Acute kidney failure


Assessment/Plan: 


Resolved


Code(s): N17.9 - ACUTE KIDNEY FAILURE, UNSPECIFIED   





(5) Diarrhea


Assessment/Plan: 


Cdiff antigen positive, toxin negative. Pt still having diarrhea >6episodes/day


Flexiseal considering pt's risk for skin breakdown and incontinence


On PO Vanco per ID 


Code(s): R19.7 - DIARRHEA, UNSPECIFIED   





(6) Hypotension


Assessment/Plan: 


Improving 


Continue IVF hydration 


Code(s): I95.9 - HYPOTENSION, UNSPECIFIED   





(7) Fall


Assessment/Plan: 


s/p fall at SNF, xrays, head CT negative, chronic fracture of left hip(

unchanged from previous CT)


Continue PT as tolerated, pt having significant pain with ambulation


May benefit from administering tramadol 30 mins prior to PT session


Code(s): W19.XXXA - UNSPECIFIED FALL, INITIAL ENCOUNTER   





(8) Hypothyroidism


Assessment/Plan: 


Continue Levothyroxine 


Code(s): E03.9 - HYPOTHYROIDISM, UNSPECIFIED   





(9) Anemia


Assessment/Plan: 


hg/hct stable


Continue Ferrous sulfate


will continue to monitor


Code(s): D64.9 - ANEMIA, UNSPECIFIED   


Qualifiers: 


   Anemia type: unspecified type   Qualified Code(s): D64.9 - Anemia, 

unspecified   





(10) Fracture of left hip


Assessment/Plan: 


unchanged from previous CT in November 


Pt had refused surgery


Continue PT as tolerated


Code(s): S72.002A - FRACTURE OF UNSP PART OF NECK OF LEFT FEMUR, INIT   


Qualifiers: 


   Encounter type: subsequent encounter   Fracture type: closed 





(11) Dementia 


Assessment/Plan: 


At baseline. Continue home meds. Monitor for changes 





(12) Chronic Left Hip Pain


Assessment/Plan: 


Pt reporting mod-severe aching left hip pain, secondary to old closed fracture, 

no intervention taken at the time


Start Tramadol PRN for adequate pain control especially before PT





Dispo: SNF when cleared by Nephrology

## 2018-01-17 NOTE — PN
Progress Note, Physician


History of Present Illness: 





patient with lot of loose stools


no other issues





- Current Medication List


Current Medications: 


Active Medications





Acetaminophen (Tylenol -)  650 mg PO Q4H PRN


   PRN Reason: PAIN OR FEVER


Ascorbic Acid (Vitamin C -)  500 mg PO TID [facility]


   Last Admin: 01/17/18 14:28 Dose:  500 mg


Aspirin (Ecotrin -)  81 mg PO DAILY [facility]


   Last Admin: 01/17/18 10:19 Dose:  81 mg


Ferrous Sulfate (Feosol -)  325 mg PO TIDCM [facility]


   Last Admin: 01/17/18 13:03 Dose:  325 mg


Heparin Sodium (Porcine) (Heparin -)  5,000 unit SQ TID [facility]


   Last Admin: 01/17/18 14:28 Dose:  5,000 unit


Sodium Chloride (Normal Saline -)  1,000 mls @ 83 mls/hr IV ASDIR [facility]


   Last Admin: 01/17/18 14:12 Dose:  83 mls/hr


Levothyroxine Sodium 100 mcg/ (Levothyroxine Sodium 50 mcg)  150 mcg PO DAILY@

0700 [facility]


   Last Admin: 01/17/18 06:05 Dose:  150 mcg


Potassium Chloride (Potassium Chloride Oral Liquid)  40 meq PO DAILY [facility]


Potassium Phos/Sodium Phos (Phos-Nak Packet -)  1 packet PO TID [facility]


   Last Admin: 01/17/18 14:28 Dose:  1 packet


Tramadol HCl (Ultram -)  50 mg PO Q8H PRN


   PRN Reason: PAIN LEVEL 7 - 10


   Last Admin: 01/17/18 14:28 Dose:  50 mg


Vancomycin HCl (Vancomycin Oral Solution)  125 mg PO Q6HPO [facility]


   Last Admin: 01/17/18 13:02 Dose:  125 mg











- Objective


Vital Signs: 


 Vital Signs











Temperature  97.8 F   01/17/18 10:00


 


Pulse Rate  78   01/17/18 10:00


 


Respiratory Rate  18   01/17/18 10:00


 


Blood Pressure  100/61   01/17/18 10:00


 


O2 Sat by Pulse Oximetry (%)  100   01/16/18 21:00











Constitutional: Yes: No Distress, Calm


Cardiovascular: Yes: S1, S2


Respiratory: Yes: Regular, CTA Bilaterally


Gastrointestinal: Yes: Normal Bowel Sounds, Soft, Other (dirrhoea flxiseal in 

place)


Musculoskeletal: Yes: WNL


Extremities: Yes: WNL


Neurological: Yes: Alert, Oriented


Psychiatric: Yes: Alert, Oriented


Labs: 


 CBC, BMP





 01/17/18 05:40 





 01/17/18 05:40 





 INR, PTT











INR  1.00  (0.82-1.09)   01/12/18  10:20    














Assessment/Plan





Problem List





- Problems


(1) Acute hyponatremia


Code(s): E87.1 - HYPO-OSMOLALITY AND HYPONATREMIA   





(2) Acute metabolic encephalopathy


Code(s): G93.41 - METABOLIC ENCEPHALOPATHY   





(3) Acute hypokalemia


Code(s): E87.6 - HYPOKALEMIA   





(4) Acute kidney failurey


Code(s): N17.9 - ACUTE KIDNEY FAILURE, UNSPECIFIED   





(5) Diarrhea


Code(s): R19.7 - DIARRHEA, UNSPECIFIED   





(6) Hypotension


Code(s): I95.9 - HYPOTENSION, UNSPECIFIED   





(7) Fall


Code(s): W19.XXXA - UNSPECIFIED FALL, INITIAL ENCOUNTER   





(8) Hypothyroidism 


Code(s): E03.9 - HYPOTHYROIDISM, UNSPECIFIED   





(9) Anemia


Code(s): D64.9 - ANEMIA, UNSPECIFIED   


Qualifiers: 


   Anemia type: unspecified type   Qualified Code(s): D64.9 - Anemia, 

unspecified   





(10) Fracture of left hip


Code(s): S72.002A - FRACTURE OF UNSP PART OF NECK OF LEFT FEMUR, INIT   


Qualifiers: 


   Encounter type: subsequent encounter   Fracture type: closed 





11 sepsis








plan


continue oral vanco


continue monitoring


replacement of looses


rest as per primary team

## 2018-01-18 LAB
ANION GAP SERPL CALC-SCNC: 10 MMOL/L (ref 8–16)
APPEARANCE UR: (no result)
BACTERIA #/AREA URNS HPF: (no result) /HPF
BASOPHILS # BLD: 0.8 % (ref 0–2)
BILIRUB UR STRIP.AUTO-MCNC: NEGATIVE MG/DL
BUN SERPL-MCNC: 18 MG/DL (ref 7–18)
CALCIUM SERPL-MCNC: 8 MG/DL (ref 8.5–10.1)
CHLORIDE SERPL-SCNC: 107 MMOL/L (ref 98–107)
CO2 SERPL-SCNC: 20 MMOL/L (ref 21–32)
COLOR UR: YELLOW
CREAT SERPL-MCNC: 0.5 MG/DL (ref 0.55–1.02)
DEPRECATED RDW RBC AUTO: 16.3 % (ref 11.6–15.6)
EOSINOPHIL # BLD: 1.6 % (ref 0–4.5)
EPITH CASTS URNS QL MICRO: (no result) /HPF
GLUCOSE SERPL-MCNC: 84 MG/DL (ref 74–106)
HCT VFR BLD CALC: 30.8 % (ref 32.4–45.2)
HGB BLD-MCNC: 9.8 GM/DL (ref 10.7–15.3)
HYALINE CASTS URNS QL MICRO: 1 /LPF
KETONES UR QL STRIP: NEGATIVE
LEUKOCYTE ESTERASE UR QL STRIP.AUTO: (no result)
LYMPHOCYTES # BLD: 15.2 % (ref 8–40)
MAGNESIUM SERPL-MCNC: 1.6 MG/DL (ref 1.8–2.4)
MCH RBC QN AUTO: 25.2 PG (ref 25.7–33.7)
MCHC RBC AUTO-ENTMCNC: 31.7 G/DL (ref 32–36)
MCV RBC: 79.6 FL (ref 80–96)
MONOCYTES # BLD AUTO: 6 % (ref 3.8–10.2)
MUCOUS THREADS URNS QL MICRO: (no result)
NEUTROPHILS # BLD: 76.4 % (ref 42.8–82.8)
NITRITE UR QL STRIP: NEGATIVE
PH UR: 5 [PH] (ref 5–8)
PHOSPHATE SERPL-MCNC: 3.7 MG/DL (ref 2.5–4.9)
PLATELET # BLD AUTO: 352 K/MM3 (ref 134–434)
PMV BLD: 8.4 FL (ref 7.5–11.1)
POTASSIUM SERPLBLD-SCNC: 4.5 MMOL/L (ref 3.5–5.1)
PROT UR QL STRIP: (no result)
PROT UR QL STRIP: NEGATIVE
RBC # BLD AUTO: 3.87 M/MM3 (ref 3.6–5.2)
RBC # UR STRIP: NEGATIVE /UL
SODIUM SERPL-SCNC: 137 MMOL/L (ref 136–145)
SP GR UR: 1.02 (ref 1–1.03)
UROBILINOGEN UR STRIP-MCNC: NEGATIVE MG/DL (ref 0.2–1)
WBC # BLD AUTO: 11.7 K/MM3 (ref 4–10)

## 2018-01-18 RX ADMIN — HEPARIN SODIUM SCH UNIT: 5000 INJECTION, SOLUTION INTRAVENOUS; SUBCUTANEOUS at 21:55

## 2018-01-18 RX ADMIN — VANCOMYCIN HYDROCHLORIDE SCH MG: 250 CAPSULE ORAL at 12:25

## 2018-01-18 RX ADMIN — VANCOMYCIN HYDROCHLORIDE SCH MG: 250 CAPSULE ORAL at 17:45

## 2018-01-18 RX ADMIN — SODIUM CHLORIDE SCH MLS/HR: 9 INJECTION, SOLUTION INTRAVENOUS at 01:21

## 2018-01-18 RX ADMIN — HEPARIN SODIUM SCH UNIT: 5000 INJECTION, SOLUTION INTRAVENOUS; SUBCUTANEOUS at 13:38

## 2018-01-18 RX ADMIN — POTASSIUM & SODIUM PHOSPHATES POWDER PACK 280-160-250 MG SCH PACKET: 280-160-250 PACK at 21:55

## 2018-01-18 RX ADMIN — FERROUS SULFATE TAB EC 324 MG (65 MG FE EQUIVALENT) SCH MG: 324 (65 FE) TABLET DELAYED RESPONSE at 08:19

## 2018-01-18 RX ADMIN — POTASSIUM & SODIUM PHOSPHATES POWDER PACK 280-160-250 MG SCH PACKET: 280-160-250 PACK at 13:38

## 2018-01-18 RX ADMIN — LEVOTHYROXINE SODIUM SCH MCG: 100 TABLET ORAL at 06:00

## 2018-01-18 RX ADMIN — SODIUM CHLORIDE SCH: 9 INJECTION, SOLUTION INTRAVENOUS at 12:10

## 2018-01-18 RX ADMIN — HEPARIN SODIUM SCH UNIT: 5000 INJECTION, SOLUTION INTRAVENOUS; SUBCUTANEOUS at 05:30

## 2018-01-18 RX ADMIN — FERROUS SULFATE TAB EC 324 MG (65 MG FE EQUIVALENT) SCH MG: 324 (65 FE) TABLET DELAYED RESPONSE at 17:45

## 2018-01-18 RX ADMIN — OXYCODONE HYDROCHLORIDE AND ACETAMINOPHEN SCH MG: 500 TABLET ORAL at 13:38

## 2018-01-18 RX ADMIN — FERROUS SULFATE TAB EC 324 MG (65 MG FE EQUIVALENT) SCH MG: 324 (65 FE) TABLET DELAYED RESPONSE at 12:25

## 2018-01-18 RX ADMIN — SODIUM CHLORIDE SCH MLS/HR: 9 INJECTION, SOLUTION INTRAVENOUS at 13:38

## 2018-01-18 RX ADMIN — OXYCODONE HYDROCHLORIDE AND ACETAMINOPHEN SCH MG: 500 TABLET ORAL at 05:30

## 2018-01-18 RX ADMIN — ASPIRIN SCH MG: 81 TABLET, COATED ORAL at 10:31

## 2018-01-18 RX ADMIN — OXYCODONE HYDROCHLORIDE AND ACETAMINOPHEN SCH MG: 500 TABLET ORAL at 21:55

## 2018-01-18 RX ADMIN — VANCOMYCIN HYDROCHLORIDE SCH MG: 250 CAPSULE ORAL at 05:31

## 2018-01-18 RX ADMIN — POTASSIUM CHLORIDE SCH MEQ: 20 SOLUTION ORAL at 10:31

## 2018-01-18 RX ADMIN — POTASSIUM & SODIUM PHOSPHATES POWDER PACK 280-160-250 MG SCH PACKET: 280-160-250 PACK at 05:30

## 2018-01-18 NOTE — PN
Progress Note, Physician


Chief Complaint: 


Pt laying in bed, slightly anxious, in no acute distress. Pt continues to have 

liquid diarrhea.  Pt denies any chest pain, sob, n/v, or unilateral weakness. 





- Current Medication List


Current Medications: 


Active Medications





Acetaminophen (Tylenol -)  650 mg PO Q4H PRN


   PRN Reason: PAIN OR FEVER


Ascorbic Acid (Vitamin C -)  500 mg PO TID [facility]


   Last Admin: 01/18/18 05:30 Dose:  500 mg


Aspirin (Ecotrin -)  81 mg PO DAILY [facility]


   Last Admin: 01/18/18 10:31 Dose:  81 mg


Ferrous Sulfate (Feosol -)  325 mg PO TIDCM [facility]


   Last Admin: 01/18/18 08:19 Dose:  325 mg


Heparin Sodium (Porcine) (Heparin -)  5,000 unit SQ TID [facility]


   Last Admin: 01/18/18 05:30 Dose:  5,000 unit


Sodium Chloride (Normal Saline -)  1,000 mls @ 83 mls/hr IV ASDIR [facility]


   Last Admin: 01/18/18 01:21 Dose:  83 mls/hr


Levothyroxine Sodium 100 mcg/ (Levothyroxine Sodium 50 mcg)  150 mcg PO DAILY@

0700 [facility]


   Last Admin: 01/18/18 06:00 Dose:  150 mcg


Lidocaine HCl (Xylocaine 2% Jelly)  1 applic TP Q12H PRN


   Last Admin: 01/17/18 21:00 Dose:  1 applic


Potassium Chloride (Potassium Chloride Oral Liquid)  40 meq PO DAILY [facility]


   Last Admin: 01/18/18 10:31 Dose:  40 meq


Potassium Phos/Sodium Phos (Phos-Nak Packet -)  1 packet PO TID [facility]


   Last Admin: 01/18/18 05:30 Dose:  1 packet


Tramadol HCl (Ultram -)  50 mg PO Q8H PRN


   PRN Reason: PAIN LEVEL 7 - 10


   Last Admin: 01/18/18 10:34 Dose:  50 mg


Vancomycin HCl (Vancomycin Oral Solution)  125 mg PO Q6HPO [facility]


   Last Admin: 01/18/18 05:31 Dose:  125 mg











- Objective


Vital Signs: 


 Vital Signs











Temperature  97.7 F   01/18/18 06:00


 


Pulse Rate  89   01/18/18 06:00


 


Respiratory Rate  18   01/18/18 06:00


 


Blood Pressure  115/74   01/18/18 06:00


 


O2 Sat by Pulse Oximetry (%)  100   01/17/18 21:00











Constitutional: Yes: No Distress, Calm


Cardiovascular: Yes: WNL, Regular Rate and Rhythm


Respiratory: Yes: WNL, CTA Bilaterally


Gastrointestinal: Yes: WNL, Normal Bowel Sounds, Soft


Edema: No


Neurological: Yes: Alert


Labs: 


 CBC, BMP





 01/18/18 06:30 





 INR, PTT











INR  1.00  (0.82-1.09)   01/12/18  10:20    














Problem List





- Problems


(1) Acute hyponatremia


Code(s): E87.1 - HYPO-OSMOLALITY AND HYPONATREMIA   





(2) Acute metabolic encephalopathy


Code(s): G93.41 - METABOLIC ENCEPHALOPATHY   





(3) Acute hypokalemia


Code(s): E87.6 - HYPOKALEMIA   





(4) Acute kidney failure


Code(s): N17.9 - ACUTE KIDNEY FAILURE, UNSPECIFIED   





(5) Diarrhea


Code(s): R19.7 - DIARRHEA, UNSPECIFIED   


Qualifiers: 


   Diarrhea type: infectious   Qualified Code(s): A09 - Infectious 

gastroenteritis and colitis, unspecified   





(6) Hypotension


Code(s): I95.9 - HYPOTENSION, UNSPECIFIED   





(7) Fall


Code(s): W19.XXXA - UNSPECIFIED FALL, INITIAL ENCOUNTER   





(8) Hypothyroidism


Code(s): E03.9 - HYPOTHYROIDISM, UNSPECIFIED   





(9) Anemia


Code(s): D64.9 - ANEMIA, UNSPECIFIED   


Qualifiers: 


   Anemia type: unspecified type   Qualified Code(s): D64.9 - Anemia, 

unspecified   





(10) Fracture of left hip


Code(s): S72.002A - FRACTURE OF UNSP PART OF NECK OF LEFT FEMUR, INIT   


Qualifiers: 


   Encounter type: subsequent encounter   Fracture type: closed 





(11) Dementia


Code(s): F03.90 - UNSPECIFIED DEMENTIA WITHOUT BEHAVIORAL DISTURBANCE   





(12) Chronic left hip pain


Code(s): M25.552 - PAIN IN LEFT HIP; G89.29 - OTHER CHRONIC PAIN   





(13) Clostridium difficile diarrhea


Code(s): A04.72 - ENTEROCOLITIS D/T CLOSTRIDIUM DIFFICILE, NOT SPCF AS RECUR   





Assessment/Plan


(1) Acute hyponatremia


Assessment/Plan: 


Resolved 


Nephrology managed


 Code(s): E87.1 - HYPO-OSMOLALITY AND HYPONATREMIA   





(2) Acute metabolic encephalopathy


Assessment/Plan: 


Resolved. pt back at baseline


Code(s): G93.41 - METABOLIC ENCEPHALOPATHY   





(3) Acute hypokalemia


Assessment/Plan: 


Resolved, potassium chloride 40meq po daily 


Continue to monitor labs 


Code(s): E87.6 - HYPOKALEMIA   





(4) Acute kidney failure


Assessment/Plan: 


Resolved


Code(s): N17.9 - ACUTE KIDNEY FAILURE, UNSPECIFIED   





(5) Diarrhea


Assessment/Plan: 


Cdiff antigen positive, toxin negative. Pt still having liquid diarrhea. 


Mild leukocytosis, pt asymptomatic, will send UA/UC and consider cxr 


Flexiseal  in place considering pt's risk for skin breakdown and incontinence


Continue PO Vanco per ID


Code(s): R19.7 - DIARRHEA, UNSPECIFIED   





(6) Hypotension


Assessment/Plan: 


Improved


Continue IVF hydration since pt still having diarrhea 


Code(s): I95.9 - HYPOTENSION, UNSPECIFIED   





(7) Fall


Assessment/Plan: 


s/p fall at SNF, xrays, head CT negative, chronic fracture of left hip(

unchanged from previous CT)


Continue PT as tolerated, pt having significant pain with ambulation


May benefit from administering tramadol 30 mins prior to PT session


Code(s): W19.XXXA - UNSPECIFIED FALL, INITIAL ENCOUNTER   





(8) Hypothyroidism


Assessment/Plan: 


Continue Levothyroxine 


Code(s): E03.9 - HYPOTHYROIDISM, UNSPECIFIED   





(9) Anemia


Assessment/Plan: 


hg/hct stable


Continue Ferrous sulfate


will continue to monitor


Code(s): D64.9 - ANEMIA, UNSPECIFIED   


Qualifiers: 


   Anemia type: unspecified type   Qualified Code(s): D64.9 - Anemia, 

unspecified   





(10) Fracture of left hip


Assessment/Plan: 


unchanged from previous CT in November 


Pt had refused surgery


Continue PT as tolerated


Code(s): S72.002A - FRACTURE OF UNSP PART OF NECK OF LEFT FEMUR, INIT   


Qualifiers: 


   Encounter type: subsequent encounter   Fracture type: closed 





(11) Dementia 


Assessment/Plan: 


At baseline. Continue home meds. Monitor for changes 





(12) Chronic Left Hip Pain


Assessment/Plan: 


secondary to old closed fracture, no intervention taken at the time


Continue Tramadol PRN for adequate pain control especially before PT





Dispo: SNF when diarrhea resolves

## 2018-01-18 NOTE — PN
Progress Note (short form)





- Note


Progress Note: 





Renal follow up for Severe Hyponatremia and renal failure





Pt seen and examined at the bedside


awake and alert


no acute complaints


no sob, chest pain


rectal tube in place


having loose stools  


 


 


 


 Vital Signs











Temperature  97.6 F   01/18/18 10:00


 


Pulse Rate  78   01/18/18 11:00


 


Respiratory Rate  16   01/18/18 10:00


 


Blood Pressure  110/70   01/18/18 10:00


 


O2 Sat by Pulse Oximetry (%)  96   01/18/18 11:00








 Intake & Output











 01/15/18 01/16/18 01/17/18 01/18/18





 23:59 23:59 23:59 23:59


 


Intake Total 2414 2112 2566 1100


 


Output Total 525 200  


 


Balance 1889 1912 2566 1100


 


Weight 37.875 kg 37.79 kg 39.519 kg 40.852 kg











NAD


soft NT/ND Abd


No LE edema





 


 


 CBC, BMP





 01/18/18 06:30 





 Current Medications





Acetaminophen (Tylenol -)  650 mg PO Q4H PRN


   PRN Reason: PAIN OR FEVER


Ascorbic Acid (Vitamin C -)  [facility]


   Last Admin: 01/18/18 05:30 Dose:  500 mg


Aspirin (Ecotrin -)  81 mg PO DAILY [facility]


   Last Admin: 01/18/18 10:31 Dose:  81 mg


Ferrous Sulfate (Feosol -)  325 mg PO TIDCM [facility]


   Last Admin: 01/18/18 12:25 Dose:  325 mg


Heparin Sodium (Porcine) (Heparin -)  5,000 unit SQ TID [facility]


   Last Admin: 01/18/18 05:30 Dose:  5,000 unit


Sodium Chloride (Normal Saline -)  1,000 mls @ 83 mls/hr IV ASDIR [facility]


   Last Admin: 01/18/18 12:10 Dose:  Not Given


Levothyroxine Sodium 100 mcg/ (Levothyroxine Sodium 50 mcg)  150 mcg PO DAILY@

0700 [facility]


   Last Admin: 01/18/18 06:00 Dose:  150 mcg


Lidocaine HCl (Xylocaine 2% Jelly)  1 applic TP Q12H PRN


   Last Admin: 01/17/18 21:00 Dose:  1 applic


Potassium Chloride (Potassium Chloride Oral Liquid)  40 meq PO DAILY [facility]


   Last Admin: 01/18/18 10:31 Dose:  40 meq


Potassium Phos/Sodium Phos (Phos-Nak Packet -)  1 packet PO TID [facility]


   Last Admin: 01/18/18 05:30 Dose:  1 packet


Tramadol HCl (Ultram -)  50 mg PO Q8H PRN


   PRN Reason: PAIN LEVEL 7 - 10


   Last Admin: 01/18/18 10:34 Dose:  50 mg


Vancomycin HCl (Vancomycin Oral Solution)  125 mg PO Q6HPO [facility]


   Last Admin: 01/18/18 12:25 Dose:  125 mg

















72 year old woman with PMhx of CKD (Hx of SKIP, Hydronephrotic right Kidney), 

Dementia, Hypertension who presented from NH with syncope and AMS as inpatient 

and found to have hyponatremia with serum Na of 109 and SKIP.





#Acute Symptomatic Hypovolemic Hyponatremia likely due to volume loss of 

diarrhea


todays labs pending


on isotonic saline


can decrease rate


would maintain on fluids as long as pt continues to have large volume diarrhea 





#Acute Renal Failure with preserved tubular function


significantly improved


continue isotonic saline for now





#Metabolic acidosis in setting of diarrhea


continue oral bicarb





#Hypokalemia


continue oral KCL





#C-diff diarrhea


continue PO Vanco





#Hypophosphatemia


continue neutraphos TID








Thank you 


Dustin An DO

## 2018-01-18 NOTE — PN
Progress Note, Physician


History of Present Illness: 





still with profuse dirrhoea


clinically stable


no specific complaints except dirrhoea


wbc normalized





- Current Medication List


Current Medications: 


Active Medications





Acetaminophen (Tylenol -)  650 mg PO Q4H PRN


   PRN Reason: PAIN OR FEVER


Ascorbic Acid (Vitamin C -)  500 mg PO TID [facility]


   Last Admin: 01/18/18 05:30 Dose:  500 mg


Aspirin (Ecotrin -)  81 mg PO DAILY [facility]


   Last Admin: 01/18/18 10:31 Dose:  81 mg


Ferrous Sulfate (Feosol -)  325 mg PO TIDCM [facility]


   Last Admin: 01/18/18 08:19 Dose:  325 mg


Heparin Sodium (Porcine) (Heparin -)  5,000 unit SQ TID [facility]


   Last Admin: 01/18/18 05:30 Dose:  5,000 unit


Sodium Chloride (Normal Saline -)  1,000 mls @ 83 mls/hr IV ASDIR [facility]


   Last Admin: 01/18/18 01:21 Dose:  83 mls/hr


Levothyroxine Sodium 100 mcg/ (Levothyroxine Sodium 50 mcg)  150 mcg PO DAILY@

0700 [facility]


   Last Admin: 01/18/18 06:00 Dose:  150 mcg


Lidocaine HCl (Xylocaine 2% Jelly)  1 applic TP Q12H PRN


   Last Admin: 01/17/18 21:00 Dose:  1 applic


Potassium Chloride (Potassium Chloride Oral Liquid)  40 meq PO DAILY [facility]


   Last Admin: 01/18/18 10:31 Dose:  40 meq


Potassium Phos/Sodium Phos (Phos-Nak Packet -)  1 packet PO TID [facility]


   Last Admin: 01/18/18 05:30 Dose:  1 packet


Tramadol HCl (Ultram -)  50 mg PO Q8H PRN


   PRN Reason: PAIN LEVEL 7 - 10


   Last Admin: 01/18/18 10:34 Dose:  50 mg


Vancomycin HCl (Vancomycin Oral Solution)  125 mg PO Q6HPO [facility]


   Last Admin: 01/18/18 05:31 Dose:  125 mg











- Objective


Vital Signs: 


 Vital Signs











Temperature  97.7 F   01/18/18 06:00


 


Pulse Rate  78   01/18/18 11:00


 


Respiratory Rate  18   01/18/18 06:00


 


Blood Pressure  115/74   01/18/18 06:00


 


O2 Sat by Pulse Oximetry (%)  96   01/18/18 11:00











Constitutional: Yes: No Distress, Calm, Thin


Cardiovascular: Yes: Regular Rate and Rhythm


Respiratory: Yes: Regular, CTA Bilaterally


Gastrointestinal: Yes: Normal Bowel Sounds, Soft, Other (dirrhoea)


Musculoskeletal: Yes: WNL


Extremities: Yes: WNL


Neurological: Yes: Alert, Oriented


Psychiatric: Yes: Alert, Oriented


Labs: 


 CBC, BMP





 01/18/18 06:30 





 INR, PTT











INR  1.00  (0.82-1.09)   01/12/18  10:20    














Assessment/Plan





Problem List





- Problems


(1) Acute hyponatremia


Code(s): E87.1 - HYPO-OSMOLALITY AND HYPONATREMIA   





(2) Acute metabolic encephalopathy


Code(s): G93.41 - METABOLIC ENCEPHALOPATHY   





(3) Acute hypokalemia


Code(s): E87.6 - HYPOKALEMIA   





(4) Acute kidney failurey


Code(s): N17.9 - ACUTE KIDNEY FAILURE, UNSPECIFIED   





(5) Diarrhea


Code(s): R19.7 - DIARRHEA, UNSPECIFIED   





(6) Hypotension


Code(s): I95.9 - HYPOTENSION, UNSPECIFIED   





(7) Fall


Code(s): W19.XXXA - UNSPECIFIED FALL, INITIAL ENCOUNTER   





(8) Hypothyroidism 


Code(s): E03.9 - HYPOTHYROIDISM, UNSPECIFIED   





(9) Anemia


Code(s): D64.9 - ANEMIA, UNSPECIFIED   


Qualifiers: 


   Anemia type: unspecified type   Qualified Code(s): D64.9 - Anemia, 

unspecified   





(10) Fracture of left hip


Code(s): S72.002A - FRACTURE OF UNSP PART OF NECK OF LEFT FEMUR, INIT   


Qualifiers: 


   Encounter type: subsequent encounter   Fracture type: closed 





11 sepsis








plan


continue oral vanco


continue monitoring


replacement of looses


rest as per primary team


stools improving

## 2018-01-18 NOTE — PN
Progress Note (short form)





- Note


Progress Note: 


Uncomfortable due to rectal tube.


No CP or SOB. 


No acute events overnight.  


 Intake & Output











 01/15/18 01/16/18 01/17/18 01/18/18





 23:59 23:59 23:59 23:59


 


Intake Total 2414 2112 2566 750


 


Output Total 525 200  


 


Balance 1889 1912 2566 750


 


Weight 83 lb 8 oz 83 lb 5 oz 87 lb 2 oz 90 lb 1 oz








 Last Vital Signs











Temp Pulse Resp BP Pulse Ox


 


 97.7 F   89   18   115/74   100 


 


 01/18/18 06:00  01/18/18 06:00  01/18/18 06:00  01/18/18 06:00  01/17/18 21:00








Active Medications





Acetaminophen (Tylenol -)  650 mg PO Q4H PRN


   PRN Reason: PAIN OR FEVER


Ascorbic Acid (Vitamin C -)  500 mg PO TID [facility]


   Last Admin: 01/18/18 05:30 Dose:  500 mg


Aspirin (Ecotrin -)  81 mg PO DAILY [facility]


   Last Admin: 01/17/18 10:19 Dose:  81 mg


Ferrous Sulfate (Feosol -)  325 mg PO TIDCM [facility]


   Last Admin: 01/18/18 08:19 Dose:  325 mg


Heparin Sodium (Porcine) (Heparin -)  5,000 unit SQ TID [facility]


   Last Admin: 01/18/18 05:30 Dose:  5,000 unit


Sodium Chloride (Normal Saline -)  1,000 mls @ 83 mls/hr IV ASDIR [facility]


   Last Admin: 01/18/18 01:21 Dose:  83 mls/hr


Levothyroxine Sodium 100 mcg/ (Levothyroxine Sodium 50 mcg)  150 mcg PO DAILY@

0700 [facility]


   Last Admin: 01/18/18 06:00 Dose:  150 mcg


Lidocaine HCl (Xylocaine 2% Jelly)  1 applic TP Q12H PRN


   Last Admin: 01/17/18 21:00 Dose:  1 applic


Potassium Chloride (Potassium Chloride Oral Liquid)  40 meq PO DAILY [facility]


Potassium Phos/Sodium Phos (Phos-Nak Packet -)  1 packet PO TID [facility]


   Last Admin: 01/18/18 05:30 Dose:  1 packet


Tramadol HCl (Ultram -)  50 mg PO Q8H PRN


   PRN Reason: PAIN LEVEL 7 - 10


   Last Admin: 01/17/18 14:28 Dose:  50 mg


Vancomycin HCl (Vancomycin Oral Solution)  125 mg PO Q6HPO [facility]


   Last Admin: 01/18/18 05:31 Dose:  125 mg














Gen:  awake, alert


HEENT: dry mucous membranes


Heart: RRR


Lung: decreased breath sounds at the bases


Abd: soft, nontender


Ext: no edema





 


 


 Laboratory Results - last 24 hr











  01/18/18





  06:30


 


WBC  11.7 H


 


RBC  3.87


 


Hgb  9.8 L


 


Hct  30.8 L


 


MCV  79.6 L


 


MCH  25.2 L


 


MCHC  31.7 L


 


RDW  16.3 H


 


Plt Count  352


 


MPV  8.4


 


Neutrophils %  76.4


 


Lymphocytes %  15.2  D


 


Monocytes %  6.0


 


Eosinophils %  1.6


 


Basophils %  0.8

















A/P


Symptomatic Severe Hyponatremia improving


Acute Kidney Injury


+ C diff


r/o UTI


CAD


HTN


Dementia








-  monitor/replete lytes 


-  ABX per ID 


-  IVF per Renal 


-  PO as tolerated


-  Aspiration precautions 


-  DVT prophylaxis





Dr Rocha

## 2018-01-19 LAB
ANION GAP SERPL CALC-SCNC: 8 MMOL/L (ref 8–16)
BASOPHILS # BLD: 0.8 % (ref 0–2)
BUN SERPL-MCNC: 10 MG/DL (ref 7–18)
CALCIUM SERPL-MCNC: 8.2 MG/DL (ref 8.5–10.1)
CHLORIDE SERPL-SCNC: 109 MMOL/L (ref 98–107)
CO2 SERPL-SCNC: 21 MMOL/L (ref 21–32)
CREAT SERPL-MCNC: 0.5 MG/DL (ref 0.55–1.02)
DEPRECATED RDW RBC AUTO: 16.7 % (ref 11.6–15.6)
EOSINOPHIL # BLD: 2.8 % (ref 0–4.5)
GLUCOSE SERPL-MCNC: 89 MG/DL (ref 74–106)
HCT VFR BLD CALC: 29.5 % (ref 32.4–45.2)
HGB BLD-MCNC: 9.5 GM/DL (ref 10.7–15.3)
LYMPHOCYTES # BLD: 14.8 % (ref 8–40)
MAGNESIUM SERPL-MCNC: 2.1 MG/DL (ref 1.8–2.4)
MCH RBC QN AUTO: 25.7 PG (ref 25.7–33.7)
MCHC RBC AUTO-ENTMCNC: 32.4 G/DL (ref 32–36)
MCV RBC: 79.5 FL (ref 80–96)
MONOCYTES # BLD AUTO: 6.1 % (ref 3.8–10.2)
NEUTROPHILS # BLD: 75.5 % (ref 42.8–82.8)
PHOSPHATE SERPL-MCNC: 3.4 MG/DL (ref 2.5–4.9)
PLATELET # BLD AUTO: 348 K/MM3 (ref 134–434)
PMV BLD: 8.4 FL (ref 7.5–11.1)
POTASSIUM SERPLBLD-SCNC: 4.6 MMOL/L (ref 3.5–5.1)
RBC # BLD AUTO: 3.71 M/MM3 (ref 3.6–5.2)
SODIUM SERPL-SCNC: 138 MMOL/L (ref 136–145)
WBC # BLD AUTO: 9.9 K/MM3 (ref 4–10)

## 2018-01-19 RX ADMIN — FERROUS SULFATE TAB EC 324 MG (65 MG FE EQUIVALENT) SCH MG: 324 (65 FE) TABLET DELAYED RESPONSE at 08:32

## 2018-01-19 RX ADMIN — POTASSIUM CHLORIDE SCH MEQ: 20 SOLUTION ORAL at 09:27

## 2018-01-19 RX ADMIN — OXYCODONE HYDROCHLORIDE AND ACETAMINOPHEN SCH MG: 500 TABLET ORAL at 06:51

## 2018-01-19 RX ADMIN — ASPIRIN SCH MG: 81 TABLET, COATED ORAL at 09:28

## 2018-01-19 RX ADMIN — VANCOMYCIN HYDROCHLORIDE SCH MG: 250 CAPSULE ORAL at 17:26

## 2018-01-19 RX ADMIN — POTASSIUM & SODIUM PHOSPHATES POWDER PACK 280-160-250 MG SCH PACKET: 280-160-250 PACK at 14:33

## 2018-01-19 RX ADMIN — VANCOMYCIN HYDROCHLORIDE SCH MG: 250 CAPSULE ORAL at 00:33

## 2018-01-19 RX ADMIN — POTASSIUM & SODIUM PHOSPHATES POWDER PACK 280-160-250 MG SCH PACKET: 280-160-250 PACK at 22:17

## 2018-01-19 RX ADMIN — POTASSIUM & SODIUM PHOSPHATES POWDER PACK 280-160-250 MG SCH PACKET: 280-160-250 PACK at 06:55

## 2018-01-19 RX ADMIN — OXYCODONE HYDROCHLORIDE AND ACETAMINOPHEN SCH MG: 500 TABLET ORAL at 14:33

## 2018-01-19 RX ADMIN — VANCOMYCIN HYDROCHLORIDE SCH MG: 250 CAPSULE ORAL at 11:57

## 2018-01-19 RX ADMIN — FERROUS SULFATE TAB EC 324 MG (65 MG FE EQUIVALENT) SCH MG: 324 (65 FE) TABLET DELAYED RESPONSE at 17:26

## 2018-01-19 RX ADMIN — VANCOMYCIN HYDROCHLORIDE SCH MG: 250 CAPSULE ORAL at 06:55

## 2018-01-19 RX ADMIN — HEPARIN SODIUM SCH UNIT: 5000 INJECTION, SOLUTION INTRAVENOUS; SUBCUTANEOUS at 22:17

## 2018-01-19 RX ADMIN — OXYCODONE HYDROCHLORIDE AND ACETAMINOPHEN SCH MG: 500 TABLET ORAL at 22:18

## 2018-01-19 RX ADMIN — FERROUS SULFATE TAB EC 324 MG (65 MG FE EQUIVALENT) SCH MG: 324 (65 FE) TABLET DELAYED RESPONSE at 11:57

## 2018-01-19 RX ADMIN — SODIUM CHLORIDE SCH: 9 INJECTION, SOLUTION INTRAVENOUS at 14:33

## 2018-01-19 RX ADMIN — LEVOTHYROXINE SODIUM SCH MCG: 100 TABLET ORAL at 06:51

## 2018-01-19 RX ADMIN — HEPARIN SODIUM SCH UNIT: 5000 INJECTION, SOLUTION INTRAVENOUS; SUBCUTANEOUS at 06:51

## 2018-01-19 RX ADMIN — HEPARIN SODIUM SCH UNIT: 5000 INJECTION, SOLUTION INTRAVENOUS; SUBCUTANEOUS at 14:33

## 2018-01-19 NOTE — PN
Progress Note (short form)





- Note


Progress Note: 





Renal follow up for Severe Hyponatremia and renal failure





Pt seen and examined at the bedside


awake and alert


continues to have liquid diarrhea


no sob, chest pain


on IVF  


 


 


 


 


 Vital Signs











Temperature  98.0 F   01/19/18 15:35


 


Pulse Rate  113 H  01/19/18 15:35


 


Respiratory Rate  20   01/19/18 15:35


 


Blood Pressure  119/63   01/19/18 15:35


 


O2 Sat by Pulse Oximetry (%)  99   01/19/18 09:00








 Intake & Output











 01/16/18 01/17/18 01/18/18 01/19/18





 23:59 23:59 23:59 23:59


 


Intake Total 2112 2566 2648 1370


 


Output Total 200   


 


Balance 1912 2566 2648 1370


 


Weight 37.79 kg 39.519 kg 40.852 kg 41.186 kg














NAD


soft NT/ND Abd


No LE edema





 


 


 


 CBC, BMP





 01/19/18 07:20 





 01/19/18 07:20 


 Laboratory Tests











  01/19/18





  07:20


 


Phosphorus  3.4


 


Magnesium  2.1











 Current Medications





Acetaminophen (Tylenol -)  650 mg PO Q4H PRN


   PRN Reason: PAIN OR FEVER


Ascorbic Acid (Vitamin C -)  500 mg PO TID [facility]


   Last Admin: 01/19/18 14:33 Dose:  500 mg


Aspirin (Ecotrin -)  81 mg PO DAILY [facility]


   Last Admin: 01/19/18 09:28 Dose:  81 mg


Ferrous Sulfate (Feosol -)  325 mg PO TIDCM [facility]


   Last Admin: 01/19/18 11:57 Dose:  325 mg


Heparin Sodium (Porcine) (Heparin -)  5,000 unit SQ TID [facility]


   Last Admin: 01/19/18 14:33 Dose:  5,000 unit


Sodium Chloride (Normal Saline -)  1,000 mls @ 60 mls/hr IV ASDIR [facility]


   Last Admin: 01/19/18 14:33 Dose:  Not Given


Levothyroxine Sodium 100 mcg/ (Levothyroxine Sodium 50 mcg)  150 mcg PO DAILY@

0700 [facility]


   Last Admin: 01/19/18 06:51 Dose:  150 mcg


Lidocaine HCl (Xylocaine 2% Jelly)  1 applic TP Q12H PRN


   Last Admin: 01/17/18 21:00 Dose:  1 applic


Potassium Chloride (Potassium Chloride Oral Liquid)  40 meq PO DAILY [facility]


   Last Admin: 01/19/18 09:27 Dose:  40 meq


Potassium Phos/Sodium Phos (Phos-Nak Packet -)  1 packet PO TID [facility]


   Last Admin: 01/19/18 14:33 Dose:  1 packet


Tramadol HCl (Ultram -)  50 mg PO Q8H PRN


   PRN Reason: PAIN LEVEL 7 - 10


   Last Admin: 01/19/18 09:27 Dose:  50 mg


Vancomycin HCl (Vancomycin Oral Solution)  125 mg PO Q6HPO [facility]


   Last Admin: 01/19/18 11:57 Dose:  125 mg

















72 year old woman with PMhx of CKD (Hx of SKIP, Hydronephrotic right Kidney), 

Dementia, Hypertension who presented from NH with syncope and AMS as inpatient 

and found to have hyponatremia with serum Na of 109 and SKIP.





#Acute Symptomatic Hypovolemic Hyponatremia and acute renal failure 


Renal function and Na improved and stable


continue IVF as pt's diarrhea continues





#Metabolic acidosis in setting of diarrhea


continue oral bicarb





#Hypokalemia


continue oral KCL





#C-diff diarrhea


continue PO Vanco





#Hypophosphatemia


continue neutraphos BID








Thank you 


Dustin An DO

## 2018-01-19 NOTE — PN
Progress Note, Physician


Chief Complaint: 


Pt laying in bed,in no acute distress. Pt continues to have liquid diarrhea.  

Pt denies any chest pain, sob, n/v, or unilateral weakness. 





- Current Medication List


Current Medications: 


Active Medications





Acetaminophen (Tylenol -)  650 mg PO Q4H PRN


   PRN Reason: PAIN OR FEVER


Ascorbic Acid (Vitamin C -)  500 mg PO TID [facility]


   Last Admin: 01/19/18 06:51 Dose:  500 mg


Aspirin (Ecotrin -)  81 mg PO DAILY [facility]


   Last Admin: 01/19/18 09:28 Dose:  81 mg


Ferrous Sulfate (Feosol -)  325 mg PO TIDCM [facility]


   Last Admin: 01/19/18 08:32 Dose:  325 mg


Heparin Sodium (Porcine) (Heparin -)  5,000 unit SQ TID [facility]


   Last Admin: 01/19/18 06:51 Dose:  5,000 unit


Sodium Chloride (Normal Saline -)  1,000 mls @ 60 mls/hr IV ASDIR [facility]


   Last Admin: 01/18/18 13:38 Dose:  60 mls/hr


Levothyroxine Sodium 100 mcg/ (Levothyroxine Sodium 50 mcg)  150 mcg PO DAILY@

0700 [facility]


   Last Admin: 01/19/18 06:51 Dose:  150 mcg


Lidocaine HCl (Xylocaine 2% Jelly)  1 applic TP Q12H PRN


   Last Admin: 01/17/18 21:00 Dose:  1 applic


Potassium Chloride (Potassium Chloride Oral Liquid)  40 meq PO DAILY [facility]


   Last Admin: 01/19/18 09:27 Dose:  40 meq


Potassium Phos/Sodium Phos (Phos-Nak Packet -)  1 packet PO TID [facility]


   Last Admin: 01/19/18 06:55 Dose:  1 packet


Tramadol HCl (Ultram -)  50 mg PO Q8H PRN


   PRN Reason: PAIN LEVEL 7 - 10


   Last Admin: 01/19/18 09:27 Dose:  50 mg


Vancomycin HCl (Vancomycin Oral Solution)  125 mg PO Q6HPO [facility]


   Last Admin: 01/19/18 06:55 Dose:  125 mg











- Objective


Vital Signs: 


 Vital Signs











Temperature  98.6 F   01/19/18 06:47


 


Pulse Rate  89   01/19/18 06:47


 


Respiratory Rate  20   01/19/18 06:47


 


Blood Pressure  109/62   01/19/18 06:47


 


O2 Sat by Pulse Oximetry (%)  96   01/18/18 21:00











Constitutional: Yes: Well Nourished, No Distress, Calm


Cardiovascular: Yes: WNL, Regular Rate and Rhythm.  No: Gallop, Murmur, Rub


Respiratory: Yes: WNL, Regular, CTA Bilaterally


Gastrointestinal: Yes: WNL, Normal Bowel Sounds, Soft


Extremities: Yes: WNL


Edema: No


Neurological: Yes: WNL, Alert


Psychiatric: Yes: WNL, Alert


Labs: 


 CBC, BMP





 01/19/18 07:20 





 01/19/18 07:20 





 INR, PTT











INR  1.00  (0.82-1.09)   01/12/18  10:20    














Problem List





- Problems


(1) Acute hyponatremia


Code(s): E87.1 - HYPO-OSMOLALITY AND HYPONATREMIA   





(2) Acute metabolic encephalopathy


Code(s): G93.41 - METABOLIC ENCEPHALOPATHY   





(3) Acute hypokalemia


Code(s): E87.6 - HYPOKALEMIA   





(4) Acute kidney failure


Code(s): N17.9 - ACUTE KIDNEY FAILURE, UNSPECIFIED   





(5) Diarrhea


Code(s): R19.7 - DIARRHEA, UNSPECIFIED   


Qualifiers: 


   Diarrhea type: infectious   Qualified Code(s): A09 - Infectious 

gastroenteritis and colitis, unspecified   





(6) Hypotension


Code(s): I95.9 - HYPOTENSION, UNSPECIFIED   





(7) Fall


Code(s): W19.XXXA - UNSPECIFIED FALL, INITIAL ENCOUNTER   





(8) Hypothyroidism


Code(s): E03.9 - HYPOTHYROIDISM, UNSPECIFIED   





(9) Anemia


Code(s): D64.9 - ANEMIA, UNSPECIFIED   


Qualifiers: 


   Anemia type: unspecified type   Qualified Code(s): D64.9 - Anemia, 

unspecified   





(10) Fracture of left hip


Code(s): S72.002A - FRACTURE OF UNSP PART OF NECK OF LEFT FEMUR, INIT   


Qualifiers: 


   Encounter type: subsequent encounter   Fracture type: closed 





(11) Dementia


Code(s): F03.90 - UNSPECIFIED DEMENTIA WITHOUT BEHAVIORAL DISTURBANCE   





(12) Chronic left hip pain


Code(s): M25.552 - PAIN IN LEFT HIP; G89.29 - OTHER CHRONIC PAIN   





(13) Clostridium difficile diarrhea


Code(s): A04.72 - ENTEROCOLITIS D/T CLOSTRIDIUM DIFFICILE, NOT SPCF AS RECUR   





Assessment/Plan


(1) Acute hyponatremia


Assessment/Plan: 


Resolved 


 Code(s): E87.1 - HYPO-OSMOLALITY AND HYPONATREMIA   





(2) Acute metabolic encephalopathy


Assessment/Plan: 


Resolved. pt back at baseline


Code(s): G93.41 - METABOLIC ENCEPHALOPATHY   





(3) Acute hypokalemia


Assessment/Plan: 


Resolved, potassium chloride 40meq po daily 


Continue to monitor labs 


Code(s): E87.6 - HYPOKALEMIA   





(4) Acute kidney failure


Assessment/Plan: 


Resolved


Code(s): N17.9 - ACUTE KIDNEY FAILURE, UNSPECIFIED   





(5) Diarrhea


Assessment/Plan: 


Cdiff antigen positive, toxin negative. Pt still having liquid diarrhea. 


Flexiseal  in place considering pt's risk for skin breakdown and incontinence


Continue PO Vanco per ID


Code(s): R19.7 - DIARRHEA, UNSPECIFIED   





(6) Hypotension


Assessment/Plan: 


Improved


Continue IVF hydration since pt still having diarrhea 


Code(s): I95.9 - HYPOTENSION, UNSPECIFIED   





(7) Fall


Assessment/Plan: 


s/p fall at SNF, xrays, head CT negative, chronic fracture of left hip(

unchanged from previous CT)


Continue PT as tolerated, pt having significant pain with ambulation


May benefit from administering tramadol 30 mins prior to PT session


Code(s): W19.XXXA - UNSPECIFIED FALL, INITIAL ENCOUNTER   





(8) Hypothyroidism


Assessment/Plan: 


Continue Levothyroxine 


Code(s): E03.9 - HYPOTHYROIDISM, UNSPECIFIED   





(9) Anemia


Assessment/Plan: 


hg/hct stable


Continue Ferrous sulfate


will continue to monitor


Code(s): D64.9 - ANEMIA, UNSPECIFIED   


Qualifiers: 


   Anemia type: unspecified type   Qualified Code(s): D64.9 - Anemia, 

unspecified   





(10) Fracture of left hip


Assessment/Plan: 


unchanged from previous CT in November 


Pt had refused surgery


Continue PT as tolerated


Code(s): S72.002A - FRACTURE OF UNSP PART OF NECK OF LEFT FEMUR, INIT   


Qualifiers: 


   Encounter type: subsequent encounter   Fracture type: closed 





(11) Dementia 


Assessment/Plan: 


At baseline. Continue home meds. Monitor for changes 





(12) Chronic Left Hip Pain


Assessment/Plan: 


secondary to old closed fracture, no intervention taken at the time


Continue Tramadol PRN for adequate pain control especially before PT





Dispo: SNF when diarrhea resolves

## 2018-01-20 LAB
ANION GAP SERPL CALC-SCNC: 9 MMOL/L (ref 8–16)
BASOPHILS # BLD: 0.6 % (ref 0–2)
BUN SERPL-MCNC: 11 MG/DL (ref 7–18)
CALCIUM SERPL-MCNC: 7.8 MG/DL (ref 8.5–10.1)
CHLORIDE SERPL-SCNC: 108 MMOL/L (ref 98–107)
CO2 SERPL-SCNC: 21 MMOL/L (ref 21–32)
CREAT SERPL-MCNC: 0.5 MG/DL (ref 0.55–1.02)
DEPRECATED RDW RBC AUTO: 16.5 % (ref 11.6–15.6)
EOSINOPHIL # BLD: 2.4 % (ref 0–4.5)
GLUCOSE SERPL-MCNC: 80 MG/DL (ref 74–106)
HCT VFR BLD CALC: 29.1 % (ref 32.4–45.2)
HGB BLD-MCNC: 9.3 GM/DL (ref 10.7–15.3)
LYMPHOCYTES # BLD: 13.1 % (ref 8–40)
MAGNESIUM SERPL-MCNC: 1.7 MG/DL (ref 1.8–2.4)
MCH RBC QN AUTO: 25.7 PG (ref 25.7–33.7)
MCHC RBC AUTO-ENTMCNC: 32 G/DL (ref 32–36)
MCV RBC: 80.3 FL (ref 80–96)
MONOCYTES # BLD AUTO: 6.9 % (ref 3.8–10.2)
NEUTROPHILS # BLD: 77 % (ref 42.8–82.8)
PHOSPHATE SERPL-MCNC: 3.2 MG/DL (ref 2.5–4.9)
PLATELET # BLD AUTO: 364 K/MM3 (ref 134–434)
PMV BLD: 8.3 FL (ref 7.5–11.1)
POTASSIUM SERPLBLD-SCNC: 4.5 MMOL/L (ref 3.5–5.1)
RBC # BLD AUTO: 3.62 M/MM3 (ref 3.6–5.2)
SODIUM SERPL-SCNC: 138 MMOL/L (ref 136–145)
WBC # BLD AUTO: 10.1 K/MM3 (ref 4–10)

## 2018-01-20 RX ADMIN — VANCOMYCIN HYDROCHLORIDE SCH MG: 250 CAPSULE ORAL at 06:47

## 2018-01-20 RX ADMIN — OXYCODONE HYDROCHLORIDE AND ACETAMINOPHEN SCH MG: 500 TABLET ORAL at 22:41

## 2018-01-20 RX ADMIN — OXYCODONE HYDROCHLORIDE AND ACETAMINOPHEN SCH MG: 500 TABLET ORAL at 14:17

## 2018-01-20 RX ADMIN — POTASSIUM & SODIUM PHOSPHATES POWDER PACK 280-160-250 MG SCH PACKET: 280-160-250 PACK at 22:41

## 2018-01-20 RX ADMIN — SODIUM CHLORIDE SCH: 9 INJECTION, SOLUTION INTRAVENOUS at 14:15

## 2018-01-20 RX ADMIN — HEPARIN SODIUM SCH UNIT: 5000 INJECTION, SOLUTION INTRAVENOUS; SUBCUTANEOUS at 22:41

## 2018-01-20 RX ADMIN — POTASSIUM CHLORIDE SCH MEQ: 20 SOLUTION ORAL at 10:29

## 2018-01-20 RX ADMIN — SODIUM CHLORIDE SCH MLS/HR: 9 INJECTION, SOLUTION INTRAVENOUS at 17:54

## 2018-01-20 RX ADMIN — VANCOMYCIN HYDROCHLORIDE SCH MG: 250 CAPSULE ORAL at 12:13

## 2018-01-20 RX ADMIN — VANCOMYCIN HYDROCHLORIDE SCH MG: 250 CAPSULE ORAL at 17:37

## 2018-01-20 RX ADMIN — HEPARIN SODIUM SCH UNIT: 5000 INJECTION, SOLUTION INTRAVENOUS; SUBCUTANEOUS at 14:17

## 2018-01-20 RX ADMIN — FERROUS SULFATE TAB EC 324 MG (65 MG FE EQUIVALENT) SCH MG: 324 (65 FE) TABLET DELAYED RESPONSE at 12:13

## 2018-01-20 RX ADMIN — SODIUM CHLORIDE SCH MLS/HR: 9 INJECTION, SOLUTION INTRAVENOUS at 01:32

## 2018-01-20 RX ADMIN — ASPIRIN SCH MG: 81 TABLET, COATED ORAL at 10:29

## 2018-01-20 RX ADMIN — HEPARIN SODIUM SCH UNIT: 5000 INJECTION, SOLUTION INTRAVENOUS; SUBCUTANEOUS at 06:47

## 2018-01-20 RX ADMIN — POTASSIUM & SODIUM PHOSPHATES POWDER PACK 280-160-250 MG SCH PACKET: 280-160-250 PACK at 10:29

## 2018-01-20 RX ADMIN — LEVOTHYROXINE SODIUM SCH MCG: 100 TABLET ORAL at 06:47

## 2018-01-20 RX ADMIN — FERROUS SULFATE TAB EC 324 MG (65 MG FE EQUIVALENT) SCH MG: 324 (65 FE) TABLET DELAYED RESPONSE at 08:48

## 2018-01-20 RX ADMIN — VANCOMYCIN HYDROCHLORIDE SCH MG: 250 CAPSULE ORAL at 00:00

## 2018-01-20 RX ADMIN — FERROUS SULFATE TAB EC 324 MG (65 MG FE EQUIVALENT) SCH MG: 324 (65 FE) TABLET DELAYED RESPONSE at 17:38

## 2018-01-20 RX ADMIN — OXYCODONE HYDROCHLORIDE AND ACETAMINOPHEN SCH MG: 500 TABLET ORAL at 06:47

## 2018-01-20 NOTE — PN
Progress Note, Physician


Chief Complaint: 


 Still green color diarrhe, rectal tube at place.





History of Present Illness: 


72 year old female with history of Dementia, frequent falls, hypothyroidism, 

Anemia and Left hip fracture(refused surgery) brought in from [facility] s/p fall/syncope and diarrhea. lab show sever Hyponatremia, Hypokalemia, 

dehydration with Diarrhea now improved. 








- Current Medication List


Current Medications: 


Active Medications





Acetaminophen (Tylenol -)  650 mg PO Q4H PRN


   PRN Reason: PAIN OR FEVER


Ascorbic Acid (Vitamin C -)  500 mg PO TID [facility]


   Last Admin: 01/20/18 06:47 Dose:  500 mg


Aspirin (Ecotrin -)  81 mg PO DAILY [facility]


   Last Admin: 01/20/18 10:29 Dose:  81 mg


Ferrous Sulfate (Feosol -)  325 mg PO TIDCM [facility]


   Last Admin: 01/20/18 12:13 Dose:  325 mg


Heparin Sodium (Porcine) (Heparin -)  5,000 unit SQ TID [facility]


   Last Admin: 01/20/18 06:47 Dose:  5,000 unit


Sodium Chloride (Normal Saline -)  1,000 mls @ 60 mls/hr IV ASDIR [facility]


   Last Admin: 01/20/18 01:32 Dose:  60 mls/hr


Levothyroxine Sodium 100 mcg/ (Levothyroxine Sodium 50 mcg)  150 mcg PO DAILY@

0700 [facility]


   Last Admin: 01/20/18 06:47 Dose:  150 mcg


Lidocaine HCl (Xylocaine 2% Jelly)  1 applic TP Q12H PRN


   Last Admin: 01/17/18 21:00 Dose:  1 applic


Potassium Chloride (Potassium Chloride Oral Liquid)  40 meq PO DAILY [facility]


   Last Admin: 01/20/18 10:29 Dose:  40 meq


Potassium Phos/Sodium Phos (Phos-Nak Packet -)  1 packet PO BID [facility]


   Last Admin: 01/20/18 10:29 Dose:  1 packet


Tramadol HCl (Ultram -)  50 mg PO Q8H PRN


   PRN Reason: PAIN LEVEL 7 - 10


   Last Admin: 01/20/18 10:29 Dose:  50 mg


Vancomycin HCl (Vancomycin Oral Solution)  125 mg PO Q6HPO [facility]


   Last Admin: 01/20/18 12:13 Dose:  125 mg











- Objective


Vital Signs: 


 Vital Signs











Temperature  98.1 F   01/20/18 10:00


 


Pulse Rate  103 H  01/20/18 10:00


 


Respiratory Rate  18   01/20/18 10:00


 


Blood Pressure  114/69   01/20/18 10:00


 


O2 Sat by Pulse Oximetry (%)  98   01/20/18 09:00























Elderly  F frail, not in distress, more alert, not in distress.





HEENT: Mm moist, no anemia, PERRLA EOMI





NECK; No JVD No Bruit





CHEST: CTA B/L





CVS: S1S2 R no m/g/r





ABD: Rectal tube at place, no distention, non tender Bs +





EXT: No edema, pulses +





CNS:  no interval changes














Labs: 


 CBC, BMP





 01/20/18 07:30 





 01/20/18 07:30 





 INR, PTT











INR  1.00  (0.82-1.09)   01/12/18  10:20    














Problem List





- Problems


(1) Acute metabolic encephalopathy


Assessment/Plan: 


Due to dehydration and severe Hypernatremia, improved.


Code(s): G93.41 - METABOLIC ENCEPHALOPATHY   





(2) Acute hyponatremia


Assessment/Plan: 


Severe Hyponatremia with Dehydration improved.


Code(s): E87.1 - HYPO-OSMOLALITY AND HYPONATREMIA   





(3) Hypothyroidism


Assessment/Plan: 


F/U TSH on Levothyroxine


Code(s): E03.9 - HYPOTHYROIDISM, UNSPECIFIED   





(4) Fracture of left hip


Assessment/Plan: 


Opted for non operative treatment, pain control, no Wt bearing.


Code(s): S72.002A - FRACTURE OF UNSP PART OF NECK OF LEFT FEMUR, INIT   


Qualifiers: 


   Encounter type: subsequent encounter   Fracture type: closed 





(5) Anemia


Assessment/Plan: 


Chronic h/h stablecont PO Iron


Code(s): D64.9 - ANEMIA, UNSPECIFIED   


Qualifiers: 


   Anemia type: unspecified type   Qualified Code(s): D64.9 - Anemia, 

unspecified   





(6) Diarrhea


Assessment/Plan: 


C Diff + Cont vancomycine


Code(s): R19.7 - DIARRHEA, UNSPECIFIED   


Qualifiers: 


   Diarrhea type: infectious   Qualified Code(s): A09 - Infectious 

gastroenteritis and colitis, unspecified   





(7) SKIP (acute kidney injury)


Assessment/Plan: 


Improved with Hydration


Code(s): N17.9 - ACUTE KIDNEY FAILURE, UNSPECIFIED

## 2018-01-20 NOTE — PN
Progress Note, Physician


History of Present Illness: 





Pt seen and examined. Still with loose stools in fecal tube. No abdominal pain. 

Remains afebrile. No other specific complaints.





- Current Medication List


Current Medications: 


Active Medications





Acetaminophen (Tylenol -)  650 mg PO Q4H PRN


   PRN Reason: PAIN OR FEVER


Ascorbic Acid (Vitamin C -)  500 mg PO TID [facility]


   Last Admin: 01/20/18 06:47 Dose:  500 mg


Aspirin (Ecotrin -)  81 mg PO DAILY [facility]


   Last Admin: 01/20/18 10:29 Dose:  81 mg


Ferrous Sulfate (Feosol -)  325 mg PO TIDCM [facility]


   Last Admin: 01/20/18 12:13 Dose:  325 mg


Heparin Sodium (Porcine) (Heparin -)  5,000 unit SQ TID [facility]


   Last Admin: 01/20/18 06:47 Dose:  5,000 unit


Sodium Chloride (Normal Saline -)  1,000 mls @ 60 mls/hr IV ASDIR [facility]


   Last Admin: 01/20/18 14:15 Dose:  Not Given


Levothyroxine Sodium 100 mcg/ (Levothyroxine Sodium 50 mcg)  150 mcg PO DAILY@

0700 [facility]


   Last Admin: 01/20/18 06:47 Dose:  150 mcg


Lidocaine HCl (Xylocaine 2% Jelly)  1 applic TP Q12H PRN


   Last Admin: 01/17/18 21:00 Dose:  1 applic


Potassium Chloride (Potassium Chloride Oral Liquid)  40 meq PO DAILY [facility]


   Last Admin: 01/20/18 10:29 Dose:  40 meq


Potassium Phos/Sodium Phos (Phos-Nak Packet -)  1 packet PO BID [facility]


   Last Admin: 01/20/18 10:29 Dose:  1 packet


Tramadol HCl (Ultram -)  50 mg PO Q8H PRN


   PRN Reason: PAIN LEVEL 7 - 10


   Last Admin: 01/20/18 10:29 Dose:  50 mg


Vancomycin HCl (Vancomycin Oral Solution)  125 mg PO Q6HPO [facility]


   Last Admin: 01/20/18 12:13 Dose:  125 mg











- Objective


Vital Signs: 


 Vital Signs











Temperature  98.1 F   01/20/18 10:00


 


Pulse Rate  103 H  01/20/18 10:00


 


Respiratory Rate  18   01/20/18 10:00


 


Blood Pressure  114/69   01/20/18 10:00


 


O2 Sat by Pulse Oximetry (%)  98   01/20/18 09:00











Constitutional: Yes: No Distress, Calm


Cardiovascular: Yes: Regular Rate and Rhythm


Respiratory: Yes: CTA Bilaterally


Gastrointestinal: Yes: Normal Bowel Sounds, Soft, Other (fecal back with liquid 

stool)


Edema: No


Neurological: Yes: Alert


Labs: 


 CBC, BMP





 01/20/18 07:30 





 01/20/18 07:30 





 INR, PTT











INR  1.00  (0.82-1.09)   01/12/18  10:20    








 Microbiology





01/18/18 15:17   Urine - Urine Clean Catch   Urine Culture - Final


                            Contaminated: Please Repeat


01/12/18 10:20   Blood - Peripheral Venous   Blood Culture - Final


                            NO GROWTH AFTER 5 DAYS INCUBATION


01/12/18 11:10   Blood - Peripheral Venous   Blood Culture - Final


                            NO GROWTH AFTER 5 DAYS INCUBATION


01/14/18 20:30   Stool   Clostridium difficile Antigen ([facility]IN) - Final


01/14/18 20:30   Stool   Clostridium difficile Toxin Assay - Final


01/12/18 10:20   Urine - Urine Clean Catch   Urine Culture - Final


                            Yeast Like Organism


01/12/18 10:20   Nasopharyngeal Swab   Influenza Types A,B Antigen ([facility]) - Final


01/12/18 10:20   Nasopharyngeal Swab    - Final











Problem List





- Problems


(1) SKIP (acute kidney injury)


Code(s): N17.9 - ACUTE KIDNEY FAILURE, UNSPECIFIED   





(2) Acute hypokalemia


Code(s): E87.6 - HYPOKALEMIA   





(3) Acute hyponatremia


Code(s): E87.1 - HYPO-OSMOLALITY AND HYPONATREMIA   





(4) Diarrhea


Code(s): R19.7 - DIARRHEA, UNSPECIFIED   


Qualifiers: 


   Diarrhea type: infectious   Qualified Code(s): A09 - Infectious 

gastroenteritis and colitis, unspecified   





Assessment/Plan





Persistent Diarrhea


C. difficile Ag +/  Toxin negative





-- cont vancomycin po


-- repeat cbc tomorrow


-- consider GI evaluation


-- replace electrolytes as needed

## 2018-01-20 NOTE — PN
Progress Note (short form)





- Note


Progress Note: 


Still with diarrhea via Flexiseal.


No CP or SOB. 


No acute events overnight.  


 


 Intake & Output











 01/17/18 01/18/18 01/19/18 01/20/18





 23:59 23:59 23:59 23:59


 


Intake Total 2566 2648 2630 1130


 


Output Total    200


 


Balance 2566 2648 2630 930


 


Weight 87 lb 2 oz 90 lb 1 oz 90 lb 12.8 oz 91 lb








 Last Vital Signs











Temp Pulse Resp BP Pulse Ox


 


 98.1 F   103 H  18   114/69   97 


 


 01/20/18 10:00  01/20/18 10:00  01/20/18 10:00  01/20/18 10:00  01/19/18 21:00








Active Medications





Acetaminophen (Tylenol -)  650 mg PO Q4H PRN


   PRN Reason: PAIN OR FEVER


Ascorbic Acid (Vitamin C -)  500 mg PO TID [facility]


   Last Admin: 01/20/18 06:47 Dose:  500 mg


Aspirin (Ecotrin -)  81 mg PO DAILY [facility]


   Last Admin: 01/20/18 10:29 Dose:  81 mg


Ferrous Sulfate (Feosol -)  325 mg PO TIDCM [facility]


   Last Admin: 01/20/18 08:48 Dose:  325 mg


Heparin Sodium (Porcine) (Heparin -)  5,000 unit SQ TID [facility]


   Last Admin: 01/20/18 06:47 Dose:  5,000 unit


Sodium Chloride (Normal Saline -)  1,000 mls @ 60 mls/hr IV ASDIR [facility]


   Last Admin: 01/20/18 01:32 Dose:  60 mls/hr


Levothyroxine Sodium 100 mcg/ (Levothyroxine Sodium 50 mcg)  150 mcg PO DAILY@

0700 [facility]


   Last Admin: 01/20/18 06:47 Dose:  150 mcg


Lidocaine HCl (Xylocaine 2% Jelly)  1 applic TP Q12H PRN


   Last Admin: 01/17/18 21:00 Dose:  1 applic


Potassium Chloride (Potassium Chloride Oral Liquid)  40 meq PO DAILY [facility]


   Last Admin: 01/20/18 10:29 Dose:  40 meq


Potassium Phos/Sodium Phos (Phos-Nak Packet -)  1 packet PO BID [facility]


   Last Admin: 01/20/18 10:29 Dose:  1 packet


Tramadol HCl (Ultram -)  50 mg PO Q8H PRN


   PRN Reason: PAIN LEVEL 7 - 10


   Last Admin: 01/20/18 10:29 Dose:  50 mg


Vancomycin HCl (Vancomycin Oral Solution)  125 mg PO Q6HPO [facility]


   Last Admin: 01/20/18 06:47 Dose:  125 mg











Gen:  awake, NAD 


HEENT: dry mucous membranes


Heart: RRR


Lung: decreased breath sounds at the bases


Abd: soft, nontender


Ext: no edema





 


 


 Laboratory Results - last 24 hr











  01/20/18 01/20/18





  07:30 07:30


 


WBC  10.1 H 


 


RBC  3.62 


 


Hgb  9.3 L 


 


Hct  29.1 L 


 


MCV  80.3 


 


MCH  25.7 


 


MCHC  32.0 


 


RDW  16.5 H 


 


Plt Count  364 


 


MPV  8.3 


 


Neutrophils %  77.0 


 


Lymphocytes %  13.1 


 


Monocytes %  6.9 


 


Eosinophils %  2.4 


 


Basophils %  0.6 


 


Sodium   138


 


Potassium   4.5


 


Chloride   108 H


 


Carbon Dioxide   21


 


Anion Gap   9


 


BUN   11


 


Creatinine   0.5 L


 


Random Glucose   80


 


Calcium   7.8 L


 


Phosphorus   3.2


 


Magnesium   1.7 L














A/P


Symptomatic Severe Hyponatremia improving


Acute Kidney Injury


+ C diff


r/o UTI


CAD


HTN


Dementia





-  monitor/replete lytes per Renal


-  ABX per ID 


-  PO as tolerated


-  Aspiration precautions 


-  DVT prophylaxis





Dr Rocha

## 2018-01-21 LAB
ALBUMIN SERPL-MCNC: 2.3 G/DL (ref 3.4–5)
ALP SERPL-CCNC: 90 U/L (ref 45–117)
ALT SERPL-CCNC: 19 U/L (ref 12–78)
ANION GAP SERPL CALC-SCNC: 7 MMOL/L (ref 8–16)
AST SERPL-CCNC: 11 U/L (ref 15–37)
BASOPHILS # BLD: 0.7 % (ref 0–2)
BILIRUB SERPL-MCNC: 0.3 MG/DL (ref 0.2–1)
BUN SERPL-MCNC: 12 MG/DL (ref 7–18)
CALCIUM SERPL-MCNC: 8.1 MG/DL (ref 8.5–10.1)
CHLORIDE SERPL-SCNC: 109 MMOL/L (ref 98–107)
CO2 SERPL-SCNC: 22 MMOL/L (ref 21–32)
CREAT SERPL-MCNC: 0.6 MG/DL (ref 0.55–1.02)
DEPRECATED RDW RBC AUTO: 17.1 % (ref 11.6–15.6)
EOSINOPHIL # BLD: 2.5 % (ref 0–4.5)
GLUCOSE SERPL-MCNC: 90 MG/DL (ref 74–106)
HCT VFR BLD CALC: 30.1 % (ref 32.4–45.2)
HGB BLD-MCNC: 9.6 GM/DL (ref 10.7–15.3)
LYMPHOCYTES # BLD: 13.6 % (ref 8–40)
MCH RBC QN AUTO: 25.9 PG (ref 25.7–33.7)
MCHC RBC AUTO-ENTMCNC: 31.9 G/DL (ref 32–36)
MCV RBC: 81.3 FL (ref 80–96)
MONOCYTES # BLD AUTO: 7 % (ref 3.8–10.2)
NEUTROPHILS # BLD: 76.2 % (ref 42.8–82.8)
PLATELET # BLD AUTO: 369 K/MM3 (ref 134–434)
PMV BLD: 8 FL (ref 7.5–11.1)
POTASSIUM SERPLBLD-SCNC: 4.6 MMOL/L (ref 3.5–5.1)
PROT SERPL-MCNC: 5.6 G/DL (ref 6.4–8.2)
RBC # BLD AUTO: 3.71 M/MM3 (ref 3.6–5.2)
SODIUM SERPL-SCNC: 138 MMOL/L (ref 136–145)
WBC # BLD AUTO: 9.8 K/MM3 (ref 4–10)

## 2018-01-21 RX ADMIN — FERROUS SULFATE TAB EC 324 MG (65 MG FE EQUIVALENT) SCH MG: 324 (65 FE) TABLET DELAYED RESPONSE at 08:38

## 2018-01-21 RX ADMIN — OXYCODONE HYDROCHLORIDE AND ACETAMINOPHEN SCH MG: 500 TABLET ORAL at 22:59

## 2018-01-21 RX ADMIN — OXYCODONE HYDROCHLORIDE AND ACETAMINOPHEN SCH MG: 500 TABLET ORAL at 14:06

## 2018-01-21 RX ADMIN — HEPARIN SODIUM SCH UNIT: 5000 INJECTION, SOLUTION INTRAVENOUS; SUBCUTANEOUS at 14:06

## 2018-01-21 RX ADMIN — LEVOTHYROXINE SODIUM SCH MCG: 100 TABLET ORAL at 06:19

## 2018-01-21 RX ADMIN — FERROUS SULFATE TAB EC 324 MG (65 MG FE EQUIVALENT) SCH MG: 324 (65 FE) TABLET DELAYED RESPONSE at 12:23

## 2018-01-21 RX ADMIN — VANCOMYCIN HYDROCHLORIDE SCH MG: 250 CAPSULE ORAL at 06:21

## 2018-01-21 RX ADMIN — POTASSIUM & SODIUM PHOSPHATES POWDER PACK 280-160-250 MG SCH PACKET: 280-160-250 PACK at 09:12

## 2018-01-21 RX ADMIN — SODIUM CHLORIDE SCH: 9 INJECTION, SOLUTION INTRAVENOUS at 14:06

## 2018-01-21 RX ADMIN — POTASSIUM CHLORIDE SCH MEQ: 20 SOLUTION ORAL at 09:13

## 2018-01-21 RX ADMIN — VANCOMYCIN HYDROCHLORIDE SCH MG: 250 CAPSULE ORAL at 12:23

## 2018-01-21 RX ADMIN — VANCOMYCIN HYDROCHLORIDE SCH MG: 250 CAPSULE ORAL at 00:08

## 2018-01-21 RX ADMIN — ASPIRIN SCH MG: 81 TABLET, COATED ORAL at 09:12

## 2018-01-21 RX ADMIN — POTASSIUM & SODIUM PHOSPHATES POWDER PACK 280-160-250 MG SCH PACKET: 280-160-250 PACK at 22:59

## 2018-01-21 RX ADMIN — OXYCODONE HYDROCHLORIDE AND ACETAMINOPHEN SCH MG: 500 TABLET ORAL at 06:19

## 2018-01-21 RX ADMIN — HEPARIN SODIUM SCH UNIT: 5000 INJECTION, SOLUTION INTRAVENOUS; SUBCUTANEOUS at 06:31

## 2018-01-21 RX ADMIN — VANCOMYCIN HYDROCHLORIDE SCH MG: 250 CAPSULE ORAL at 17:39

## 2018-01-21 NOTE — PN
Progress Note (short form)





- Note


Progress Note: 


Resting in NAD. No CP or SOB. 


Still with diarrhea via Flexiseal.


No acute events overnight.  


 


 


 Intake & Output











 01/18/18 01/19/18 01/20/18 01/21/18





 23:59 23:59 23:59 23:59


 


Intake Total 2648 2630 2350 400


 


Output Total   200 


 


Balance 2648 2630 2150 400


 


Weight 90 lb 1 oz 90 lb 12.8 oz 91 lb 89 lb 3 oz








 Last Vital Signs











Temp Pulse Resp BP Pulse Ox


 


 97.9 F   80   18   113/65   98 


 


 01/21/18 07:15  01/21/18 07:15  01/21/18 07:15  01/21/18 07:15  01/20/18 21:00








Active Medications





Acetaminophen (Tylenol -)  650 mg PO Q4H PRN


   PRN Reason: PAIN OR FEVER


Ascorbic Acid (Vitamin C -)  500 mg PO TID [facility]


   Last Admin: 01/21/18 06:19 Dose:  500 mg


Aspirin (Ecotrin -)  81 mg PO DAILY [facility]


   Last Admin: 01/21/18 09:12 Dose:  81 mg


Ferrous Sulfate (Feosol -)  325 mg PO TIDCM [facility]


   Last Admin: 01/21/18 08:38 Dose:  325 mg


Heparin Sodium (Porcine) (Heparin -)  5,000 unit SQ TID [facility]


   Last Admin: 01/21/18 06:31 Dose:  5,000 unit


Sodium Chloride (Normal Saline -)  1,000 mls @ 60 mls/hr IV ASDIR [facility]


   Last Admin: 01/20/18 17:54 Dose:  60 mls/hr


Levothyroxine Sodium 100 mcg/ (Levothyroxine Sodium 50 mcg)  150 mcg PO DAILY@

0700 [facility]


   Last Admin: 01/21/18 06:19 Dose:  150 mcg


Lidocaine HCl (Xylocaine 2% Jelly)  1 applic TP Q12H PRN


   Last Admin: 01/17/18 21:00 Dose:  1 applic


Potassium Chloride (Potassium Chloride Oral Liquid)  40 meq PO DAILY [facility]


   Last Admin: 01/21/18 09:13 Dose:  40 meq


Potassium Phos/Sodium Phos (Phos-Nak Packet -)  1 packet PO BID [facility]


   Last Admin: 01/21/18 09:12 Dose:  1 packet


Tramadol HCl (Ultram -)  50 mg PO Q8H PRN


   PRN Reason: PAIN LEVEL 7 - 10


   Last Admin: 01/20/18 10:29 Dose:  50 mg


Vancomycin HCl (Vancomycin Oral Solution)  125 mg PO Q6HPO [facility]


   Last Admin: 01/21/18 06:21 Dose:  125 mg











Gen:  awake, NAD 


HEENT: (-) Icterus 


Heart: RRR


Lung: decreased breath sounds at the bases


Abd: soft, nontender


Ext: no edema





 


 


 


 Laboratory Results - last 24 hr











  01/21/18 01/21/18





  07:00 07:00


 


WBC  9.8 


 


RBC  3.71 


 


Hgb  9.6 L 


 


Hct  30.1 L 


 


MCV  81.3 


 


MCH  25.9 


 


MCHC  31.9 L 


 


RDW  17.1 H 


 


Plt Count  369 


 


MPV  8.0 


 


Neutrophils %  76.2 


 


Lymphocytes %  13.6 


 


Monocytes %  7.0 


 


Eosinophils %  2.5 


 


Basophils %  0.7 


 


Sodium   138


 


Potassium   4.6


 


Chloride   109 H


 


Carbon Dioxide   22


 


Anion Gap   7 L


 


BUN   12


 


Creatinine   0.6


 


Creat Clearance w eGFR   > 60


 


Random Glucose   90


 


Calcium   8.1 L


 


Total Bilirubin   0.3  D


 


AST   11 L


 


ALT   19


 


Alkaline Phosphatase   90


 


Total Protein   5.6 L


 


Albumin   2.3 L

















A/P


Symptomatic Severe Hyponatremia improving


Acute Kidney Injury


+ C diff


r/o UTI


CAD


HTN


Dementia








-  monitor/replete lytes per Renal


-  ABX per ID 


-  PO as tolerated


-  Aspiration precautions 


-  DVT prophylaxis





Dr Rocha

## 2018-01-21 NOTE — PN
Progress Note, Physician


History of Present Illness: 





Pt remains clinically the same. Still with liquid stools but no abdominal 

discomfort, fever. Alert, with no other complaints. 





- Current Medication List


Current Medications: 


Active Medications





Acetaminophen (Tylenol -)  650 mg PO Q4H PRN


   PRN Reason: PAIN OR FEVER


Ascorbic Acid (Vitamin C -)  500 mg PO TID [facility]


   Last Admin: 01/21/18 14:06 Dose:  500 mg


Aspirin (Ecotrin -)  81 mg PO DAILY [facility]


   Last Admin: 01/21/18 09:12 Dose:  81 mg


Heparin Sodium (Porcine) (Heparin -)  5,000 unit SQ TID [facility]


   Last Admin: 01/21/18 14:06 Dose:  5,000 unit


Sodium Chloride (Normal Saline -)  1,000 mls @ 60 mls/hr IV ASDIR [facility]


   Last Admin: 01/21/18 14:06 Dose:  Not Given


Levothyroxine Sodium 100 mcg/ (Levothyroxine Sodium 50 mcg)  150 mcg PO DAILY@

0700 [facility]


   Last Admin: 01/21/18 06:19 Dose:  150 mcg


Lidocaine HCl (Xylocaine 2% Jelly)  1 applic TP Q12H PRN


   Last Admin: 01/17/18 21:00 Dose:  1 applic


Potassium Chloride (Potassium Chloride Oral Liquid)  40 meq PO DAILY [facility]


   Last Admin: 01/21/18 09:13 Dose:  40 meq


Potassium Phos/Sodium Phos (Phos-Nak Packet -)  1 packet PO BID [facility]


   Last Admin: 01/21/18 09:12 Dose:  1 packet


Vancomycin HCl (Vancomycin Oral Solution)  125 mg PO Q6HPO [facility]


   Last Admin: 01/21/18 12:23 Dose:  125 mg











- Objective


Vital Signs: 


 Vital Signs











Temperature  98.7 F   01/21/18 14:52


 


Pulse Rate  107 H  01/21/18 14:52


 


Respiratory Rate  18   01/21/18 14:52


 


Blood Pressure  113/69   01/21/18 14:52


 


O2 Sat by Pulse Oximetry (%)  98   01/20/18 21:00











Constitutional: Yes: No Distress, Calm


Cardiovascular: Yes: WNL


Respiratory: Yes: Regular


Gastrointestinal: Yes: Normal Bowel Sounds, Soft


...Rectal Exam: Yes: Other (liquid stool in fecal bag)


Edema: No


Neurological: Yes: Alert


Labs: 


 CBC, BMP





 01/21/18 07:00 





 01/21/18 07:00 





 INR, PTT











INR  1.00  (0.82-1.09)   01/12/18  10:20    














Problem List





- Problems


(1) SKIP (acute kidney injury)


Code(s): N17.9 - ACUTE KIDNEY FAILURE, UNSPECIFIED   





(2) Acute hypokalemia


Code(s): E87.6 - HYPOKALEMIA   





(3) Acute hyponatremia


Code(s): E87.1 - HYPO-OSMOLALITY AND HYPONATREMIA   





(4) Diarrhea


Code(s): R19.7 - DIARRHEA, UNSPECIFIED   


Qualifiers: 


   Diarrhea type: infectious   Qualified Code(s): A09 - Infectious 

gastroenteritis and colitis, unspecified   





Assessment/Plan





Diarrhea


C. difficile Ag +/  Toxin negative





-- afebrile, no leukocytosis


-- cont vancomycin po


-- replace electrolytes as needed

## 2018-01-21 NOTE — PN
Progress Note, Physician


Chief Complaint: 


 Still green color diarrhe, rectal tube at place.





History of Present Illness: 


72 year old female with history of Dementia, frequent falls, hypothyroidism, 

Anemia and Left hip fracture(refused surgery) brought in from [facility] s/p fall/syncope and diarrhea. lab show sever Hyponatremia, Hypokalemia, 

dehydration with Diarrhea now improved. 








- Current Medication List


Current Medications: 


Active Medications





Acetaminophen (Tylenol -)  650 mg PO Q4H PRN


   PRN Reason: PAIN OR FEVER


Ascorbic Acid (Vitamin C -)  500 mg PO TID [facility]


   Last Admin: 01/21/18 06:19 Dose:  500 mg


Aspirin (Ecotrin -)  81 mg PO DAILY [facility]


   Last Admin: 01/21/18 09:12 Dose:  81 mg


Ferrous Sulfate (Feosol -)  325 mg PO TIDCM [facility]


   Last Admin: 01/21/18 12:23 Dose:  325 mg


Heparin Sodium (Porcine) (Heparin -)  5,000 unit SQ TID [facility]


   Last Admin: 01/21/18 06:31 Dose:  5,000 unit


Sodium Chloride (Normal Saline -)  1,000 mls @ 60 mls/hr IV ASDIR [facility]


   Last Admin: 01/20/18 17:54 Dose:  60 mls/hr


Levothyroxine Sodium 100 mcg/ (Levothyroxine Sodium 50 mcg)  150 mcg PO DAILY@

0700 [facility]


   Last Admin: 01/21/18 06:19 Dose:  150 mcg


Lidocaine HCl (Xylocaine 2% Jelly)  1 applic TP Q12H PRN


   Last Admin: 01/17/18 21:00 Dose:  1 applic


Potassium Chloride (Potassium Chloride Oral Liquid)  40 meq PO DAILY [facility]


   Last Admin: 01/21/18 09:13 Dose:  40 meq


Potassium Phos/Sodium Phos (Phos-Nak Packet -)  1 packet PO BID [facility]


   Last Admin: 01/21/18 09:12 Dose:  1 packet


Vancomycin HCl (Vancomycin Oral Solution)  125 mg PO Q6HPO [facility]


   Last Admin: 01/21/18 12:23 Dose:  125 mg











- Objective


Vital Signs: 


 Vital Signs











Temperature  97.9 F   01/21/18 07:15


 


Pulse Rate  80   01/21/18 07:15


 


Respiratory Rate  18   01/21/18 07:15


 


Blood Pressure  113/65   01/21/18 07:15


 


O2 Sat by Pulse Oximetry (%)  98   01/20/18 21:00
































Elderly  F frail, not in distress, more alert, not in distress.





HEENT: Mm moist, no anemia, PERRLA EOMI





NECK; No JVD No Bruit





CHEST: CTA B/L





CVS: S1S2 R no m/g/r





ABD: Rectal tube at place, no distention, non tender Bs +





EXT: No edema, pulses +





CNS:  no interval changes




















Labs: 


 CBC, BMP





 01/21/18 07:00 





 01/21/18 07:00 





 INR, PTT











INR  1.00  (0.82-1.09)   01/12/18  10:20    














Problem List





- Problems


(1) Acute metabolic encephalopathy


Assessment/Plan: 


Due to dehydration and severe Hypernatremia, improved.


Code(s): G93.41 - METABOLIC ENCEPHALOPATHY   





(2) Acute hyponatremia


Assessment/Plan: 


Severe Hyponatremia with Dehydration improved.


Code(s): E87.1 - HYPO-OSMOLALITY AND HYPONATREMIA   





(3) Hypothyroidism








(4) Fracture of left hip


Assessment/Plan: 


Opted for non operative treatment, pain control, no Wt bearing.


Code(s): S72.002A - FRACTURE OF UNSP PART OF NECK OF LEFT FEMUR, INIT   


Qualifiers: 


   Encounter type: subsequent encounter   Fracture type: closed 





(5) Anemia


Assessment/Plan: 


hOLD IRON FOR DIARRHEA 


Code(s): D64.9 - ANEMIA, UNSPECIFIED   


Qualifiers: 


   Anemia type: unspecified type   Qualified Code(s): D64.9 - Anemia, 

unspecified   





(6) Diarrhea


Assessment/Plan: 


C Diff + Cont vancomycine


Code(s): R19.7 - DIARRHEA, UNSPECIFIED   


Qualifiers: 


   Diarrhea type: infectious   Qualified Code(s): A09 - Infectious 

gastroenteritis and colitis, unspecified   





(7) SKIP (acute kidney injury)


Assessment/Plan: 


Improved with Hydration


Code(s): N17.9 - ACUTE KIDNEY FAILURE, UNSPECIFIED   





(8) Clostridium difficile diarrhea


Assessment/Plan: 


cONT po VANCOMYCINE


Code(s): A04.72 - ENTEROCOLITIS D/T CLOSTRIDIUM DIFFICILE, NOT SPCF AS RECUR

## 2018-01-22 LAB
ANION GAP SERPL CALC-SCNC: 9 MMOL/L (ref 8–16)
BASOPHILS # BLD: 0.6 % (ref 0–2)
BUN SERPL-MCNC: 17 MG/DL (ref 7–18)
CALCIUM SERPL-MCNC: 8.1 MG/DL (ref 8.5–10.1)
CHLORIDE SERPL-SCNC: 107 MMOL/L (ref 98–107)
CO2 SERPL-SCNC: 22 MMOL/L (ref 21–32)
CREAT SERPL-MCNC: 0.6 MG/DL (ref 0.55–1.02)
DEPRECATED RDW RBC AUTO: 17.1 % (ref 11.6–15.6)
EOSINOPHIL # BLD: 2 % (ref 0–4.5)
GLUCOSE SERPL-MCNC: 83 MG/DL (ref 74–106)
HCT VFR BLD CALC: 29.4 % (ref 32.4–45.2)
HGB BLD-MCNC: 9.4 GM/DL (ref 10.7–15.3)
LYMPHOCYTES # BLD: 10.4 % (ref 8–40)
MAGNESIUM SERPL-MCNC: 1.7 MG/DL (ref 1.8–2.4)
MCH RBC QN AUTO: 25.9 PG (ref 25.7–33.7)
MCHC RBC AUTO-ENTMCNC: 32.1 G/DL (ref 32–36)
MCV RBC: 80.5 FL (ref 80–96)
MONOCYTES # BLD AUTO: 7.5 % (ref 3.8–10.2)
NEUTROPHILS # BLD: 79.5 % (ref 42.8–82.8)
PLATELET # BLD AUTO: 355 K/MM3 (ref 134–434)
PMV BLD: 8.2 FL (ref 7.5–11.1)
POTASSIUM SERPLBLD-SCNC: 4.4 MMOL/L (ref 3.5–5.1)
RBC # BLD AUTO: 3.65 M/MM3 (ref 3.6–5.2)
SODIUM SERPL-SCNC: 138 MMOL/L (ref 136–145)
WBC # BLD AUTO: 11.4 K/MM3 (ref 4–10)

## 2018-01-22 RX ADMIN — VANCOMYCIN HYDROCHLORIDE SCH MG: 250 CAPSULE ORAL at 17:44

## 2018-01-22 RX ADMIN — OXYCODONE HYDROCHLORIDE AND ACETAMINOPHEN SCH MG: 500 TABLET ORAL at 06:12

## 2018-01-22 RX ADMIN — OXYCODONE HYDROCHLORIDE AND ACETAMINOPHEN SCH MG: 500 TABLET ORAL at 15:12

## 2018-01-22 RX ADMIN — ASPIRIN SCH MG: 81 TABLET, COATED ORAL at 09:52

## 2018-01-22 RX ADMIN — OXYCODONE HYDROCHLORIDE AND ACETAMINOPHEN SCH MG: 500 TABLET ORAL at 21:26

## 2018-01-22 RX ADMIN — POTASSIUM & SODIUM PHOSPHATES POWDER PACK 280-160-250 MG SCH PACKET: 280-160-250 PACK at 09:52

## 2018-01-22 RX ADMIN — VANCOMYCIN HYDROCHLORIDE SCH MG: 250 CAPSULE ORAL at 06:13

## 2018-01-22 RX ADMIN — LEVOTHYROXINE SODIUM SCH MCG: 100 TABLET ORAL at 06:11

## 2018-01-22 RX ADMIN — VANCOMYCIN HYDROCHLORIDE SCH MG: 250 CAPSULE ORAL at 00:35

## 2018-01-22 RX ADMIN — POTASSIUM CHLORIDE SCH MEQ: 20 SOLUTION ORAL at 09:52

## 2018-01-22 RX ADMIN — SODIUM CHLORIDE SCH: 9 INJECTION, SOLUTION INTRAVENOUS at 20:50

## 2018-01-22 RX ADMIN — SODIUM CHLORIDE SCH MLS/HR: 9 INJECTION, SOLUTION INTRAVENOUS at 01:05

## 2018-01-22 RX ADMIN — VANCOMYCIN HYDROCHLORIDE SCH MG: 250 CAPSULE ORAL at 12:05

## 2018-01-22 RX ADMIN — POTASSIUM & SODIUM PHOSPHATES POWDER PACK 280-160-250 MG SCH PACKET: 280-160-250 PACK at 21:26

## 2018-01-22 NOTE — PN
Progress Note, Physician


History of Present Illness: 





stable


continues to have dirrhoea





- Current Medication List


Current Medications: 


Active Medications





Acetaminophen (Tylenol -)  650 mg PO Q4H PRN


   PRN Reason: PAIN OR FEVER


Ascorbic Acid (Vitamin C -)  500 mg PO TID [facility]


   Last Admin: 01/22/18 06:12 Dose:  500 mg


Aspirin (Ecotrin -)  81 mg PO DAILY [facility]


   Last Admin: 01/22/18 09:52 Dose:  81 mg


Sodium Chloride (Normal Saline -)  1,000 mls @ 60 mls/hr IV ASDIR [facility]


   Last Admin: 01/22/18 01:05 Dose:  60 mls/hr


Lactobacillus Acidophilus (Bacid -)  1 tab PO DAILY [facility]


Levothyroxine Sodium 100 mcg/ (Levothyroxine Sodium 50 mcg)  150 mcg PO DAILY@

0700 [facility]


   Last Admin: 01/22/18 06:11 Dose:  150 mcg


Lidocaine HCl (Xylocaine 2% Jelly)  1 applic TP Q12H PRN


   Last Admin: 01/17/18 21:00 Dose:  1 applic


Potassium Chloride (Potassium Chloride Oral Liquid)  40 meq PO DAILY [facility]


   Last Admin: 01/22/18 09:52 Dose:  40 meq


Potassium Phos/Sodium Phos (Phos-Nak Packet -)  1 packet PO BID [facility]


   Last Admin: 01/22/18 09:52 Dose:  1 packet


Vancomycin HCl (Vancomycin Oral Solution)  125 mg PO Q6HPO [facility]


   Last Admin: 01/22/18 12:05 Dose:  125 mg











- Objective


Vital Signs: 


 Vital Signs











Temperature  97.3 F L  01/22/18 07:45


 


Pulse Rate  93 H  01/22/18 07:45


 


Respiratory Rate  20   01/22/18 07:45


 


Blood Pressure  96/59   01/22/18 07:45


 


O2 Sat by Pulse Oximetry (%)  98   01/20/18 21:00











Constitutional: Yes: No Distress, Calm, Thin


Cardiovascular: Yes: Regular Rate and Rhythm


Respiratory: Yes: Regular, CTA Bilaterally


Gastrointestinal: Yes: Normal Bowel Sounds, Soft


Musculoskeletal: Yes: WNL


Extremities: Yes: WNL


Neurological: Yes: Alert, Oriented


Psychiatric: Yes: Alert


Labs: 


 CBC, BMP





 01/22/18 06:00 





 01/22/18 06:00 





 INR, PTT











INR  1.00  (0.82-1.09)   01/12/18  10:20    














Assessment/Plan





Problem List





- Problems


(1) Acute hyponatremia


Code(s): E87.1 - HYPO-OSMOLALITY AND HYPONATREMIA   





(2) Acute metabolic encephalopathy


Code(s): G93.41 - METABOLIC ENCEPHALOPATHY   





(3) Acute hypokalemia


Code(s): E87.6 - HYPOKALEMIA   





(4) Acute kidney failurey


Code(s): N17.9 - ACUTE KIDNEY FAILURE, UNSPECIFIED   





(5) Diarrhea


Code(s): R19.7 - DIARRHEA, UNSPECIFIED   





(6) Hypotension


Code(s): I95.9 - HYPOTENSION, UNSPECIFIED   





(7) Fall


Code(s): W19.XXXA - UNSPECIFIED FALL, INITIAL ENCOUNTER   





(8) Hypothyroidism 


Code(s): E03.9 - HYPOTHYROIDISM, UNSPECIFIED   





(9) Anemia


Code(s): D64.9 - ANEMIA, UNSPECIFIED   


Qualifiers: 


   Anemia type: unspecified type   Qualified Code(s): D64.9 - Anemia, 

unspecified   





(10) Fracture of left hip


Code(s): S72.002A - FRACTURE OF UNSP PART OF NECK OF LEFT FEMUR, INIT   


Qualifiers: 


   Encounter type: subsequent encounter   Fracture type: closed 





11 sepsis








plan


continue oral vanco


continue monitoring

## 2018-01-22 NOTE — PN
Progress Note, Physician


Chief Complaint: 


Pt laying in bed,in no acute distress. Pt reports she wants to go back to SNF. 

Pt continues to have liquid diarrhea through flexiseal.  Pt denies any chest 

pain, sob, n/v, or unilateral weakness. 





- Current Medication List


Current Medications: 


Active Medications





Acetaminophen (Tylenol -)  650 mg PO Q4H PRN


   PRN Reason: PAIN OR FEVER


Ascorbic Acid (Vitamin C -)  500 mg PO TID [facility]


   Last Admin: 01/22/18 06:12 Dose:  500 mg


Aspirin (Ecotrin -)  81 mg PO DAILY [facility]


   Last Admin: 01/21/18 09:12 Dose:  81 mg


Sodium Chloride (Normal Saline -)  1,000 mls @ 60 mls/hr IV ASDIR [facility]


   Last Admin: 01/22/18 01:05 Dose:  60 mls/hr


Levothyroxine Sodium 100 mcg/ (Levothyroxine Sodium 50 mcg)  150 mcg PO DAILY@

0700 [facility]


   Last Admin: 01/22/18 06:11 Dose:  150 mcg


Lidocaine HCl (Xylocaine 2% Jelly)  1 applic TP Q12H PRN


   Last Admin: 01/17/18 21:00 Dose:  1 applic


Potassium Chloride (Potassium Chloride Oral Liquid)  40 meq PO DAILY [facility]


   Last Admin: 01/21/18 09:13 Dose:  40 meq


Potassium Phos/Sodium Phos (Phos-Nak Packet -)  1 packet PO BID [facility]


   Last Admin: 01/21/18 22:59 Dose:  1 packet


Vancomycin HCl (Vancomycin Oral Solution)  125 mg PO Q6HPO [facility]


   Last Admin: 01/22/18 06:13 Dose:  125 mg











- Objective


Vital Signs: 


 Vital Signs











Temperature  97.3 F L  01/22/18 07:45


 


Pulse Rate  93 H  01/22/18 07:45


 


Respiratory Rate  20   01/22/18 07:45


 


Blood Pressure  96/59   01/22/18 07:45


 


O2 Sat by Pulse Oximetry (%)  98   01/20/18 21:00











Constitutional: Yes: No Distress, Calm


Cardiovascular: Yes: WNL, Regular Rate and Rhythm.  No: Gallop, Murmur, Rub


Respiratory: Yes: WNL, Regular, CTA Bilaterally.  No: Rales, Rhonchi, SOB, 

Tachypnea, Wheezes


Gastrointestinal: Yes: WNL, Normal Bowel Sounds, Soft


Extremities: Yes: WNL


Edema: No


Neurological: Yes: WNL, Alert


Psychiatric: Yes: WNL, Alert


Labs: 


 CBC, BMP





 01/22/18 06:00 





 01/22/18 06:00 





 INR, PTT











INR  1.00  (0.82-1.09)   01/12/18  10:20    














Problem List





- Problems


(1) Acute hyponatremia


Code(s): E87.1 - HYPO-OSMOLALITY AND HYPONATREMIA   





(2) Acute metabolic encephalopathy


Code(s): G93.41 - METABOLIC ENCEPHALOPATHY   





(3) Acute hypokalemia


Code(s): E87.6 - HYPOKALEMIA   





(4) Acute kidney failure


Code(s): N17.9 - ACUTE KIDNEY FAILURE, UNSPECIFIED   





(5) Diarrhea


Code(s): R19.7 - DIARRHEA, UNSPECIFIED   


Qualifiers: 


   Diarrhea type: infectious   Qualified Code(s): A09 - Infectious 

gastroenteritis and colitis, unspecified   





(6) Hypotension


Code(s): I95.9 - HYPOTENSION, UNSPECIFIED   





(7) Fall


Code(s): W19.XXXA - UNSPECIFIED FALL, INITIAL ENCOUNTER   





(8) Hypothyroidism


Code(s): E03.9 - HYPOTHYROIDISM, UNSPECIFIED   





(9) Anemia


Code(s): D64.9 - ANEMIA, UNSPECIFIED   


Qualifiers: 


   Anemia type: unspecified type   Qualified Code(s): D64.9 - Anemia, 

unspecified   





(10) Fracture of left hip


Code(s): S72.002A - FRACTURE OF UNSP PART OF NECK OF LEFT FEMUR, INIT   


Qualifiers: 


   Encounter type: subsequent encounter   Fracture type: closed 





(11) Dementia


Code(s): F03.90 - UNSPECIFIED DEMENTIA WITHOUT BEHAVIORAL DISTURBANCE   





(12) Chronic left hip pain


Code(s): M25.552 - PAIN IN LEFT HIP; G89.29 - OTHER CHRONIC PAIN   





(13) Clostridium difficile diarrhea


Code(s): A04.72 - ENTEROCOLITIS D/T CLOSTRIDIUM DIFFICILE, NOT SPCF AS RECUR   





Assessment/Plan


(1) Acute hyponatremia


Assessment/Plan: 


Resolved 


 Code(s): E87.1 - HYPO-OSMOLALITY AND HYPONATREMIA   





(2) Acute metabolic encephalopathy


Assessment/Plan: 


Resolved. pt back at baseline


Code(s): G93.41 - METABOLIC ENCEPHALOPATHY   





(3) Acute hypokalemia


Assessment/Plan: 


Resolved, potassium chloride 40meq po daily 


Continue to monitor labs 


Code(s): E87.6 - HYPOKALEMIA   





(4) Acute kidney failure


Assessment/Plan: 


Resolved


Code(s): N17.9 - ACUTE KIDNEY FAILURE, UNSPECIFIED   





(5) Diarrhea


Assessment/Plan: 


Cdiff antigen positive, toxin negative. Pt still having diarrhea. 


d/c flexiseal 


Continue PO Vanco per ID


Code(s): R19.7 - DIARRHEA, UNSPECIFIED   





(6) Hypotension


Assessment/Plan: 


Improved


Continue IVF hydration since pt still having diarrhea 


Code(s): I95.9 - HYPOTENSION, UNSPECIFIED   





(7) Fall


Assessment/Plan: 


s/p fall at SNF, xrays, head CT negative, chronic fracture of left hip(

unchanged from previous CT)


Continue PT as tolerated, pt having significant pain with ambulation


May benefit from administering tramadol 30 mins prior to PT session


Code(s): W19.XXXA - UNSPECIFIED FALL, INITIAL ENCOUNTER   





(8) Hypothyroidism


Assessment/Plan: 


Continue Levothyroxine 


Code(s): E03.9 - HYPOTHYROIDISM, UNSPECIFIED   





(9) Anemia


Assessment/Plan: 


hg/hct stable


Continue Ferrous sulfate


will continue to monitor


Code(s): D64.9 - ANEMIA, UNSPECIFIED   


Qualifiers: 


   Anemia type: unspecified type   Qualified Code(s): D64.9 - Anemia, 

unspecified   





(10) Fracture of left hip


Assessment/Plan: 


unchanged from previous CT in November 


Pt had refused surgery


Continue PT as tolerated


Code(s): S72.002A - FRACTURE OF UNSP PART OF NECK OF LEFT FEMUR, INIT   


Qualifiers: 


   Encounter type: subsequent encounter   Fracture type: closed 





(11) Dementia 


Assessment/Plan: 


At baseline. Continue home meds. Monitor for changes 





(12) Chronic Left Hip Pain


Assessment/Plan: 


secondary to old closed fracture, no intervention taken at the time


Continue Tramadol PRN for adequate pain control especially before PT





Dispo: SNF when diarrhea resolves

## 2018-01-23 LAB
ANION GAP SERPL CALC-SCNC: 10 MMOL/L (ref 8–16)
BASOPHILS # BLD: 0.5 % (ref 0–2)
BUN SERPL-MCNC: 21 MG/DL (ref 7–18)
CALCIUM SERPL-MCNC: 8.3 MG/DL (ref 8.5–10.1)
CHLORIDE SERPL-SCNC: 105 MMOL/L (ref 98–107)
CO2 SERPL-SCNC: 22 MMOL/L (ref 21–32)
CREAT SERPL-MCNC: 0.6 MG/DL (ref 0.55–1.02)
DEPRECATED RDW RBC AUTO: 17.3 % (ref 11.6–15.6)
EOSINOPHIL # BLD: 3.7 % (ref 0–4.5)
GLUCOSE SERPL-MCNC: 86 MG/DL (ref 74–106)
HCT VFR BLD CALC: 28.9 % (ref 32.4–45.2)
HGB BLD-MCNC: 9.3 GM/DL (ref 10.7–15.3)
LYMPHOCYTES # BLD: 13.6 % (ref 8–40)
MAGNESIUM SERPL-MCNC: 2.3 MG/DL (ref 1.8–2.4)
MCH RBC QN AUTO: 26 PG (ref 25.7–33.7)
MCHC RBC AUTO-ENTMCNC: 32.2 G/DL (ref 32–36)
MCV RBC: 80.8 FL (ref 80–96)
MONOCYTES # BLD AUTO: 7.9 % (ref 3.8–10.2)
NEUTROPHILS # BLD: 74.3 % (ref 42.8–82.8)
PHOSPHATE SERPL-MCNC: 3.3 MG/DL (ref 2.5–4.9)
PLATELET # BLD AUTO: 349 K/MM3 (ref 134–434)
PMV BLD: 8.3 FL (ref 7.5–11.1)
POTASSIUM SERPLBLD-SCNC: 4.4 MMOL/L (ref 3.5–5.1)
RBC # BLD AUTO: 3.58 M/MM3 (ref 3.6–5.2)
SODIUM SERPL-SCNC: 137 MMOL/L (ref 136–145)
WBC # BLD AUTO: 10.2 K/MM3 (ref 4–10)

## 2018-01-23 RX ADMIN — OXYCODONE HYDROCHLORIDE AND ACETAMINOPHEN SCH MG: 500 TABLET ORAL at 06:40

## 2018-01-23 RX ADMIN — OXYCODONE HYDROCHLORIDE AND ACETAMINOPHEN SCH MG: 500 TABLET ORAL at 16:33

## 2018-01-23 RX ADMIN — SODIUM CHLORIDE SCH MLS/HR: 9 INJECTION, SOLUTION INTRAVENOUS at 10:26

## 2018-01-23 RX ADMIN — POTASSIUM & SODIUM PHOSPHATES POWDER PACK 280-160-250 MG SCH PACKET: 280-160-250 PACK at 21:08

## 2018-01-23 RX ADMIN — ASPIRIN SCH MG: 81 TABLET, COATED ORAL at 10:26

## 2018-01-23 RX ADMIN — POTASSIUM & SODIUM PHOSPHATES POWDER PACK 280-160-250 MG SCH PACKET: 280-160-250 PACK at 10:26

## 2018-01-23 RX ADMIN — Medication SCH TAB: at 10:26

## 2018-01-23 RX ADMIN — VANCOMYCIN HYDROCHLORIDE SCH MG: 250 CAPSULE ORAL at 13:07

## 2018-01-23 RX ADMIN — LEVOTHYROXINE SODIUM SCH MCG: 100 TABLET ORAL at 06:40

## 2018-01-23 RX ADMIN — OXYCODONE HYDROCHLORIDE AND ACETAMINOPHEN SCH MG: 500 TABLET ORAL at 21:06

## 2018-01-23 RX ADMIN — POTASSIUM CHLORIDE SCH MEQ: 20 SOLUTION ORAL at 10:25

## 2018-01-23 RX ADMIN — VANCOMYCIN HYDROCHLORIDE SCH MG: 250 CAPSULE ORAL at 01:54

## 2018-01-23 RX ADMIN — VANCOMYCIN HYDROCHLORIDE SCH MG: 250 CAPSULE ORAL at 06:40

## 2018-01-23 RX ADMIN — VANCOMYCIN HYDROCHLORIDE SCH MG: 250 CAPSULE ORAL at 18:57

## 2018-01-23 RX ADMIN — SODIUM CHLORIDE SCH MLS/HR: 9 INJECTION, SOLUTION INTRAVENOUS at 13:08

## 2018-01-23 NOTE — PN
Progress Note, Physician


History of Present Illness: 





dirrhoea improving


no new issues


patient stable





- Current Medication List


Current Medications: 


Active Medications





Acetaminophen (Tylenol -)  650 mg PO Q4H PRN


   PRN Reason: PAIN OR FEVER


Ascorbic Acid (Vitamin C -)  500 mg PO TID [facility]


   Last Admin: 01/23/18 06:40 Dose:  500 mg


Aspirin (Ecotrin -)  81 mg PO DAILY [facility]


   Last Admin: 01/23/18 10:26 Dose:  81 mg


Sodium Chloride (Normal Saline -)  1,000 mls @ 60 mls/hr IV ASDIR [facility]


   Last Admin: 01/23/18 13:08 Dose:  60 mls/hr


Lactobacillus Acidophilus (Bacid -)  1 tab PO DAILY [facility]


   Last Admin: 01/23/18 10:26 Dose:  1 tab


Levothyroxine Sodium 100 mcg/ (Levothyroxine Sodium 50 mcg)  150 mcg PO DAILY@

0700 [facility]


   Last Admin: 01/23/18 06:40 Dose:  150 mcg


Lidocaine HCl (Xylocaine 2% Jelly)  1 applic TP Q12H PRN


   Last Admin: 01/17/18 21:00 Dose:  1 applic


Potassium Chloride (Potassium Chloride Oral Liquid)  40 meq PO DAILY [facility]


   Last Admin: 01/23/18 10:25 Dose:  40 meq


Potassium Phos/Sodium Phos (Phos-Nak Packet -)  1 packet PO BID [facility]


   Last Admin: 01/23/18 10:26 Dose:  1 packet


Vancomycin HCl (Vancomycin Oral Solution)  125 mg PO Q6HPO [facility]


   Last Admin: 01/23/18 13:07 Dose:  125 mg











- Objective


Vital Signs: 


 Vital Signs











Temperature  97.5 F L  01/23/18 10:00


 


Pulse Rate  93 H  01/23/18 10:00


 


Respiratory Rate  18   01/23/18 10:00


 


Blood Pressure  105/48   01/23/18 10:00


 


O2 Sat by Pulse Oximetry (%)  96   01/22/18 21:00











Constitutional: Yes: No Distress, Calm


Cardiovascular: Yes: Regular Rate and Rhythm


Respiratory: Yes: Regular, CTA Bilaterally


Gastrointestinal: Yes: Normal Bowel Sounds, Soft


Musculoskeletal: Yes: WNL


Extremities: Yes: WNL


Neurological: Yes: Alert, Oriented


Psychiatric: Yes: Alert, Oriented


Labs: 


 CBC, BMP





 01/23/18 07:30 





 01/23/18 07:30 





 INR, PTT











INR  1.00  (0.82-1.09)   01/12/18  10:20    














Assessment/Plan





Problem List





- Problems


(1) Acute hyponatremia


Code(s): E87.1 - HYPO-OSMOLALITY AND HYPONATREMIA   





(2) Acute metabolic encephalopathy


Code(s): G93.41 - METABOLIC ENCEPHALOPATHY   





(3) Acute hypokalemia


Code(s): E87.6 - HYPOKALEMIA   





(4) Acute kidney failurey


Code(s): N17.9 - ACUTE KIDNEY FAILURE, UNSPECIFIED   





(5) Diarrhea


Code(s): R19.7 - DIARRHEA, UNSPECIFIED   





(6) Hypotension


Code(s): I95.9 - HYPOTENSION, UNSPECIFIED   





(7) Fall


Code(s): W19.XXXA - UNSPECIFIED FALL, INITIAL ENCOUNTER   





(8) Hypothyroidism 


Code(s): E03.9 - HYPOTHYROIDISM, UNSPECIFIED   





(9) Anemia


Code(s): D64.9 - ANEMIA, UNSPECIFIED   


Qualifiers: 


   Anemia type: unspecified type   Qualified Code(s): D64.9 - Anemia, 

unspecified   





(10) Fracture of left hip


Code(s): S72.002A - FRACTURE OF UNSP PART OF NECK OF LEFT FEMUR, INIT   


Qualifiers: 


   Encounter type: subsequent encounter   Fracture type: closed 





11 sepsis








plan


continue oral vanco


continue monitoring


hydration


rest as per primary team

## 2018-01-23 NOTE — PN
Progress Note, Physician


Chief Complaint: 


Pt laying in bed,in no acute distress. Diarrhea less frequent and semi form in 

consistency per rn staff.  Pt denies any chest pain, sob, n/v, or unilateral 

weakness. 





- Current Medication List


Current Medications: 


Active Medications





Acetaminophen (Tylenol -)  650 mg PO Q4H PRN


   PRN Reason: PAIN OR FEVER


Ascorbic Acid (Vitamin C -)  500 mg PO TID [facility]


   Last Admin: 01/23/18 06:40 Dose:  500 mg


Aspirin (Ecotrin -)  81 mg PO DAILY [facility]


   Last Admin: 01/23/18 10:26 Dose:  81 mg


Sodium Chloride (Normal Saline -)  1,000 mls @ 60 mls/hr IV ASDIR [facility]


   Last Admin: 01/23/18 10:26 Dose:  60 mls/hr


Lactobacillus Acidophilus (Bacid -)  1 tab PO DAILY [facility]


   Last Admin: 01/23/18 10:26 Dose:  1 tab


Levothyroxine Sodium 100 mcg/ (Levothyroxine Sodium 50 mcg)  150 mcg PO DAILY@

0700 [facility]


   Last Admin: 01/23/18 06:40 Dose:  150 mcg


Lidocaine HCl (Xylocaine 2% Jelly)  1 applic TP Q12H PRN


   Last Admin: 01/17/18 21:00 Dose:  1 applic


Potassium Chloride (Potassium Chloride Oral Liquid)  40 meq PO DAILY [facility]


   Last Admin: 01/23/18 10:25 Dose:  40 meq


Potassium Phos/Sodium Phos (Phos-Nak Packet -)  1 packet PO BID [facility]


   Last Admin: 01/23/18 10:26 Dose:  1 packet


Vancomycin HCl (Vancomycin Oral Solution)  125 mg PO Q6HPO [facility]


   Last Admin: 01/23/18 06:40 Dose:  125 mg











- Objective


Vital Signs: 


 Vital Signs











Temperature  98.7 F   01/23/18 05:39


 


Pulse Rate  85   01/23/18 05:39


 


Respiratory Rate  20   01/23/18 05:39


 


Blood Pressure  101/58   01/23/18 05:39


 


O2 Sat by Pulse Oximetry (%)  96   01/22/18 21:00











Constitutional: Yes: No Distress, Calm


Cardiovascular: Yes: WNL, Regular Rate and Rhythm.  No: Gallop, Murmur, Rub


Respiratory: Yes: WNL, Regular, CTA Bilaterally, Diminished


Gastrointestinal: Yes: WNL, Normal Bowel Sounds, Soft.  No: Distention


Extremities: Yes: WNL


Edema: No


Neurological: Yes: WNL, Alert


Psychiatric: Yes: WNL, Alert


Labs: 


 CBC, BMP





 01/23/18 07:30 





 01/23/18 07:30 





 INR, PTT











INR  1.00  (0.82-1.09)   01/12/18  10:20    














Problem List





- Problems


(1) Clostridium difficile diarrhea


Code(s): A04.72 - ENTEROCOLITIS D/T CLOSTRIDIUM DIFFICILE, NOT SPCF AS RECUR   





(2) Acute hyponatremia


Code(s): E87.1 - HYPO-OSMOLALITY AND HYPONATREMIA   





(3) Acute metabolic encephalopathy


Code(s): G93.41 - METABOLIC ENCEPHALOPATHY   





(4) Acute hypokalemia


Code(s): E87.6 - HYPOKALEMIA   





(5) Acute kidney failure


Code(s): N17.9 - ACUTE KIDNEY FAILURE, UNSPECIFIED   





(6) Diarrhea


Code(s): R19.7 - DIARRHEA, UNSPECIFIED   


Qualifiers: 


   Diarrhea type: infectious   Qualified Code(s): A09 - Infectious 

gastroenteritis and colitis, unspecified   





(7) Hypotension


Code(s): I95.9 - HYPOTENSION, UNSPECIFIED   





(8) Fall


Code(s): W19.XXXA - UNSPECIFIED FALL, INITIAL ENCOUNTER   





(9) Hypothyroidism


Code(s): E03.9 - HYPOTHYROIDISM, UNSPECIFIED   





(10) Anemia


Code(s): D64.9 - ANEMIA, UNSPECIFIED   


Qualifiers: 


   Anemia type: unspecified type   Qualified Code(s): D64.9 - Anemia, 

unspecified   





(11) Fracture of left hip


Code(s): S72.002A - FRACTURE OF UNSP PART OF NECK OF LEFT FEMUR, INIT   


Qualifiers: 


   Encounter type: subsequent encounter   Fracture type: closed 





(12) Dementia


Code(s): F03.90 - UNSPECIFIED DEMENTIA WITHOUT BEHAVIORAL DISTURBANCE   





(13) Chronic left hip pain


Code(s): M25.552 - PAIN IN LEFT HIP; G89.29 - OTHER CHRONIC PAIN   





Assessment/Plan


(1)Clostridium difficile diarrhea


Assessment/Plan: 


improving, less frequent and semi form consistency 


Continue PO Vanco per ID


   


(2) Acute metabolic encephalopathy


Assessment/Plan: 


Resolved. pt back at baseline


Code(s): G93.41 - METABOLIC ENCEPHALOPATHY   





(3) Acute hypokalemia


Assessment/Plan: 


Resolved, potassium chloride 40meq po daily 


Continue to monitor labs 


Code(s): E87.6 - HYPOKALEMIA   





(4) Acute kidney failure


Assessment/Plan: 


Resolved


Code(s): N17.9 - ACUTE KIDNEY FAILURE, UNSPECIFIED   





(5)  Acute hyponatremia


Assessment/Plan: 


Resolved 


Code(s): E87.1 - HYPO-OSMOLALITY AND HYPONATREMIA





(6) Hypotension


Assessment/Plan: 


Improved


Continue IVF hydration since pt still having diarrhea 


Code(s): I95.9 - HYPOTENSION, UNSPECIFIED   





(7) Fall


Assessment/Plan: 


s/p fall at SNF, xrays, head CT negative, chronic fracture of left hip(

unchanged from previous CT)


Continue PT as tolerated


Tramadol prn for pain control


Code(s): W19.XXXA - UNSPECIFIED FALL, INITIAL ENCOUNTER   





(8) Hypothyroidism


Assessment/Plan: 


Continue Levothyroxine 


Code(s): E03.9 - HYPOTHYROIDISM, UNSPECIFIED   





(9) Anemia


Assessment/Plan: 


hg/hct stable


Continue Ferrous sulfate


will continue to monitor


Code(s): D64.9 - ANEMIA, UNSPECIFIED   


Qualifiers: 


   Anemia type: unspecified type   Qualified Code(s): D64.9 - Anemia, 

unspecified   





(10) Fracture of left hip


Assessment/Plan: 


unchanged from previous CT in November 


Pt had refused surgery


Continue PT as tolerated


Code(s): S72.002A - FRACTURE OF UNSP PART OF NECK OF LEFT FEMUR, INIT   


Qualifiers: 


   Encounter type: subsequent encounter   Fracture type: closed 





(11) Dementia 


Assessment/Plan: 


At baseline. Continue home meds. Monitor for changes 





(12) Chronic Left Hip Pain


Assessment/Plan: 


secondary to old closed fracture, no intervention taken at the time


Continue Tramadol PRN for adequate pain control 





Dispo: SNF when diarrhea resolves

## 2018-01-24 VITALS — HEART RATE: 84 BPM | SYSTOLIC BLOOD PRESSURE: 118 MMHG | DIASTOLIC BLOOD PRESSURE: 70 MMHG | TEMPERATURE: 98.2 F

## 2018-01-24 LAB
ANION GAP SERPL CALC-SCNC: 8 MMOL/L (ref 8–16)
BASOPHILS # BLD: 0.5 % (ref 0–2)
BUN SERPL-MCNC: 20 MG/DL (ref 7–18)
CALCIUM SERPL-MCNC: 8.3 MG/DL (ref 8.5–10.1)
CHLORIDE SERPL-SCNC: 105 MMOL/L (ref 98–107)
CO2 SERPL-SCNC: 24 MMOL/L (ref 21–32)
CREAT SERPL-MCNC: 0.6 MG/DL (ref 0.55–1.02)
DEPRECATED RDW RBC AUTO: 17.2 % (ref 11.6–15.6)
EOSINOPHIL # BLD: 2.9 % (ref 0–4.5)
GLUCOSE SERPL-MCNC: 89 MG/DL (ref 74–106)
HCT VFR BLD CALC: 30.7 % (ref 32.4–45.2)
HGB BLD-MCNC: 9.8 GM/DL (ref 10.7–15.3)
LYMPHOCYTES # BLD: 13.9 % (ref 8–40)
MAGNESIUM SERPL-MCNC: 1.8 MG/DL (ref 1.8–2.4)
MCH RBC QN AUTO: 25.9 PG (ref 25.7–33.7)
MCHC RBC AUTO-ENTMCNC: 32 G/DL (ref 32–36)
MCV RBC: 80.9 FL (ref 80–96)
MONOCYTES # BLD AUTO: 9.1 % (ref 3.8–10.2)
NEUTROPHILS # BLD: 73.6 % (ref 42.8–82.8)
PHOSPHATE SERPL-MCNC: 3.8 MG/DL (ref 2.5–4.9)
PLATELET # BLD AUTO: 358 K/MM3 (ref 134–434)
PMV BLD: 7.9 FL (ref 7.5–11.1)
POTASSIUM SERPLBLD-SCNC: 4.4 MMOL/L (ref 3.5–5.1)
RBC # BLD AUTO: 3.8 M/MM3 (ref 3.6–5.2)
SODIUM SERPL-SCNC: 137 MMOL/L (ref 136–145)
WBC # BLD AUTO: 9.5 K/MM3 (ref 4–10)

## 2018-01-24 RX ADMIN — Medication SCH TAB: at 10:53

## 2018-01-24 RX ADMIN — VANCOMYCIN HYDROCHLORIDE SCH MG: 250 CAPSULE ORAL at 12:40

## 2018-01-24 RX ADMIN — OXYCODONE HYDROCHLORIDE AND ACETAMINOPHEN SCH MG: 500 TABLET ORAL at 06:36

## 2018-01-24 RX ADMIN — VANCOMYCIN HYDROCHLORIDE SCH MG: 250 CAPSULE ORAL at 06:36

## 2018-01-24 RX ADMIN — POTASSIUM CHLORIDE SCH MEQ: 20 SOLUTION ORAL at 10:53

## 2018-01-24 RX ADMIN — LEVOTHYROXINE SODIUM SCH MCG: 100 TABLET ORAL at 06:36

## 2018-01-24 RX ADMIN — SODIUM CHLORIDE SCH: 9 INJECTION, SOLUTION INTRAVENOUS at 16:01

## 2018-01-24 RX ADMIN — VANCOMYCIN HYDROCHLORIDE SCH MG: 250 CAPSULE ORAL at 00:00

## 2018-01-24 RX ADMIN — POTASSIUM & SODIUM PHOSPHATES POWDER PACK 280-160-250 MG SCH PACKET: 280-160-250 PACK at 10:54

## 2018-01-24 RX ADMIN — ASPIRIN SCH MG: 81 TABLET, COATED ORAL at 10:54

## 2018-01-24 RX ADMIN — OXYCODONE HYDROCHLORIDE AND ACETAMINOPHEN SCH MG: 500 TABLET ORAL at 16:01

## 2018-01-24 NOTE — DS
Physical Examination


Vital Signs: 


 Vital Signs











Temperature  98.4 F   01/24/18 06:24


 


Pulse Rate  94 H  01/24/18 06:24


 


Respiratory Rate  20   01/24/18 06:24


 


Blood Pressure  113/64   01/24/18 06:24


 


O2 Sat by Pulse Oximetry (%)  96   01/23/18 21:00











Findings/Remarks: 





 reports she is doing well today. Diarrhea a lot less. 4bms>24hrs. 

Otherwise, she is doing great. 


Constitutional: Yes: No Distress, Calm


Cardiovascular: Yes: WNL, Regular Rate and Rhythm.  No: Gallop, Murmur, Rub


Respiratory: Yes: WNL, Regular, CTA Bilaterally, Diminished.  No: Rales, Rhonchi

, SOB, Tachypnea


Gastrointestinal: Yes: WNL, Normal Bowel Sounds, Soft.  No: Distention


Extremities: Yes: WNL


Edema: No


Neurological: Yes: WNL, Alert


Psychiatric: Yes: WNL


Labs: 


 CBC, BMP





 01/24/18 06:30 





 01/24/18 06:30 











Discharge Summary


Reason For Visit: HYPONATREMIA (HISTORY OF C-DIFF)


Current Active Problems





SKIP (acute kidney injury) (Acute)


Acute hypokalemia (Acute)


Acute hyponatremia (Acute)


Acute metabolic encephalopathy (Acute)


Chronic left hip pain (Acute)


Clostridium difficile diarrhea (Acute)


Dementia (Acute)


Diarrhea (Acute)


Hyponatremia (Acute)


Hypotension (Acute)


Hypothyroidism (Acute)








Hospital Course: 


 is a pleasant 72 year old female with pmh of CKD, Dementia, 

frequent falls, hypothyroidism, Anemia and Left hip fracture(conservative 

management) brought in from SNF with AMS, diarrhea, and syncope/fall.  She was 

found to have severe hyponatremia related to dehydration and cdiff colitis 

during admission. Hypernatremia was successfully corrected without 

complications. With adequate hydration and electrolyte repletion, her mental 

status returned back to baseline. Head CT/further imaging work up did not 

reveal any concerning findings other than previous chronic left hip fracture. 

 has been working well with PT. Her LOS was prolonged due to cdiff 

colitis for which she is being treated with Oral Vancomycin. Her hostpial stay 

otherwise has been uneventful. 


Condition: Fair





- Instructions


Diet, Activity, Other Instructions: 


resume prev diet, activity as tolerated. 





PO Vancomycin 250mg qid FOR 4 MORE DAYS to complete 14 day course. 


Referrals: 


Tyrone Huerta MD [Primary Care Provider] - 


Disposition: SKILLED NURSING FACILITY





- Home Medications


Comprehensive Discharge Medication List: 


Ambulatory Orders





Aa/Hydrolyzed Collagen, Whey [Lps Neutral Flavor Liquid] 30 ml PO BID 01/12/18 


Acetaminophen [Tylenol] 650 mg PO Q6H PRN 01/12/18 


Ascorbic Acid [Vitamin C -] 500 mg PO TID 01/12/18 


Aspirin [Aspirin EC] 81 mg PO DAILY 01/12/18 


Bacitracin - [Bacitracin Topical Ointment -] 1 applic TP DAILY 01/12/18 


Docusate Sodium [Colace -] 300 mg PO HS 01/12/18 


Ferrous Sulfate [Feosol] 325 mg PO TID 01/12/18 


Heparin - 5,000 unit SQ BID 01/12/18 


Lactobacillus Acidophilus [Acidophilus] 1 each PO BID 01/12/18 


Levothyroxine Sodium [Levoxyl] 150 mcg PO DAILY 01/12/18 


Omeprazole Magnesium [Prilosec Otc] 20 mg PO DAILY 01/12/18 


Potassium Chloride [K-Dur -] 40 meq PO DAILY 01/12/18 


Quetiapine Fumarate [Seroquel -] 50 mg PO BID 01/12/18 


Sennosides [Senna] 2 tab PO DAILY 01/12/18 


Trazodone HCl 25 mg PO HS 01/12/18 





VANCOMYCIN PO 250MG QID X 4 DAYS

## 2018-01-24 NOTE — PN
Progress Note, Physician


History of Present Illness: 





stable


no new issues





- Current Medication List


Current Medications: 


Active Medications





Acetaminophen (Tylenol -)  650 mg PO Q4H PRN


   PRN Reason: PAIN OR FEVER


Ascorbic Acid (Vitamin C -)  500 mg PO TID [facility]


   Last Admin: 01/24/18 06:36 Dose:  500 mg


Aspirin (Ecotrin -)  81 mg PO DAILY [facility]


   Last Admin: 01/24/18 10:54 Dose:  81 mg


Sodium Chloride (Normal Saline -)  1,000 mls @ 60 mls/hr IV ASDIR [facility]


   Last Admin: 01/23/18 13:08 Dose:  60 mls/hr


Lactobacillus Acidophilus (Bacid -)  1 tab PO DAILY [facility]


   Last Admin: 01/24/18 10:53 Dose:  1 tab


Levothyroxine Sodium 100 mcg/ (Levothyroxine Sodium 50 mcg)  150 mcg PO DAILY@

0700 [facility]


   Last Admin: 01/24/18 06:36 Dose:  150 mcg


Lidocaine HCl (Xylocaine 2% Jelly)  1 applic TP Q12H PRN


   Last Admin: 01/17/18 21:00 Dose:  1 applic


Potassium Chloride (Potassium Chloride Oral Liquid)  40 meq PO DAILY [facility]


   Last Admin: 01/24/18 10:53 Dose:  40 meq


Potassium Phos/Sodium Phos (Phos-Nak Packet -)  1 packet PO BID [facility]


   Last Admin: 01/24/18 10:54 Dose:  1 packet


Vancomycin HCl (Vancomycin Oral Solution)  125 mg PO Q6HPO [facility]


   Last Admin: 01/24/18 12:40 Dose:  125 mg











- Objective


Vital Signs: 


 Vital Signs











Temperature  98.2 F   01/24/18 13:29


 


Pulse Rate  84   01/24/18 13:29


 


Respiratory Rate  16   01/24/18 13:29


 


Blood Pressure  118/70   01/24/18 13:29


 


O2 Sat by Pulse Oximetry (%)  96   01/23/18 21:00











Constitutional: Yes: No Distress, Calm, Thin


Cardiovascular: Yes: Regular Rate and Rhythm


Respiratory: Yes: Regular, CTA Bilaterally


Gastrointestinal: Yes: Normal Bowel Sounds, Soft


Musculoskeletal: Yes: WNL


Extremities: Yes: WNL


Labs: 


 CBC, BMP





 01/24/18 06:30 





 01/24/18 06:30 





 INR, PTT











INR  1.00  (0.82-1.09)   01/12/18  10:20    














Assessment/Plan





Problem List





- Problems


(1) Acute hyponatremia


Code(s): E87.1 - HYPO-OSMOLALITY AND HYPONATREMIA   





(2) Acute metabolic encephalopathy


Code(s): G93.41 - METABOLIC ENCEPHALOPATHY   





(3) Acute hypokalemia


Code(s): E87.6 - HYPOKALEMIA   





(4) Acute kidney failurey


Code(s): N17.9 - ACUTE KIDNEY FAILURE, UNSPECIFIED   





(5) Diarrhea


Code(s): R19.7 - DIARRHEA, UNSPECIFIED   





(6) Hypotension


Code(s): I95.9 - HYPOTENSION, UNSPECIFIED   





(7) Fall


Code(s): W19.XXXA - UNSPECIFIED FALL, INITIAL ENCOUNTER   





(8) Hypothyroidism 


Code(s): E03.9 - HYPOTHYROIDISM, UNSPECIFIED   





(9) Anemia


Code(s): D64.9 - ANEMIA, UNSPECIFIED   


Qualifiers: 


   Anemia type: unspecified type   Qualified Code(s): D64.9 - Anemia, 

unspecified   





(10) Fracture of left hip


Code(s): S72.002A - FRACTURE OF UNSP PART OF NECK OF LEFT FEMUR, INIT   


Qualifiers: 


   Encounter type: subsequent encounter   Fracture type: closed 





11 sepsis








plan


continue oral vanco


complete a 14 day course


continue monitoring


hydration


rest as per primary team